# Patient Record
Sex: FEMALE | Race: WHITE | HISPANIC OR LATINO | Employment: PART TIME | URBAN - METROPOLITAN AREA
[De-identification: names, ages, dates, MRNs, and addresses within clinical notes are randomized per-mention and may not be internally consistent; named-entity substitution may affect disease eponyms.]

---

## 2020-04-28 ENCOUNTER — TELEPHONE (OUTPATIENT)
Dept: MEDICAL GROUP | Facility: MEDICAL CENTER | Age: 35
End: 2020-04-28

## 2020-04-28 NOTE — TELEPHONE ENCOUNTER
Left message with patient about no show to appointment today 4/28/20.  Explained this was her first no show and the no show policy.

## 2020-05-06 ENCOUNTER — TELEPHONE (OUTPATIENT)
Dept: HEALTH INFORMATION MANAGEMENT | Facility: OTHER | Age: 35
End: 2020-05-06

## 2020-05-06 NOTE — TELEPHONE ENCOUNTER
1. Caller Name: kaitlin valdez            Call Back Number: [phone]  [facility] PCP or Specialty Provider: No          2.  Does patient have any active symptoms of respiratory illness? Yes, the patient reports the following respiratory symptoms: shortness of breath or difficulty breathing.    3.  Does patient have any comoribidities? COPD    4.  Has the patient traveled in the last 14 days OR had any known contact with someone who is suspected or confirmed to have COVID-19?  No.    5. Disposition: Cleared by RN Triage as potential is low for COVID-19; OK to keep/schedule appointment    Note routed to [facility] Provider: JG only.pt has no symptoms whatsoever except occ watery and itchy eyes relieved by allergy medicine. Pt states shes had allergies for many years. pt has asthma for many years as well and has no resp symptoms for 6 months.  Pt cleared for pcp visit in person by stefania pacheco rn

## 2020-07-15 ENCOUNTER — HOSPITAL ENCOUNTER (EMERGENCY)
Facility: MEDICAL CENTER | Age: 35
End: 2020-07-15
Attending: EMERGENCY MEDICINE
Payer: MEDICAID

## 2020-07-15 VITALS
WEIGHT: 213.41 LBS | OXYGEN SATURATION: 98 % | TEMPERATURE: 97.8 F | RESPIRATION RATE: 18 BRPM | SYSTOLIC BLOOD PRESSURE: 128 MMHG | DIASTOLIC BLOOD PRESSURE: 85 MMHG | HEART RATE: 59 BPM | BODY MASS INDEX: 37.81 KG/M2 | HEIGHT: 63 IN

## 2020-07-15 DIAGNOSIS — R10.84 GENERALIZED ABDOMINAL PAIN: ICD-10-CM

## 2020-07-15 LAB
ALBUMIN SERPL BCP-MCNC: 3.5 G/DL (ref 3.2–4.9)
ALBUMIN/GLOB SERPL: 1.4 G/DL
ALP SERPL-CCNC: 57 U/L (ref 30–99)
ALT SERPL-CCNC: 20 U/L (ref 2–50)
ANION GAP SERPL CALC-SCNC: 10 MMOL/L (ref 7–16)
APPEARANCE UR: ABNORMAL
AST SERPL-CCNC: 17 U/L (ref 12–45)
BACTERIA #/AREA URNS HPF: ABNORMAL /HPF
BASOPHILS # BLD AUTO: 0.4 % (ref 0–1.8)
BASOPHILS # BLD: 0.03 K/UL (ref 0–0.12)
BILIRUB SERPL-MCNC: 0.2 MG/DL (ref 0.1–1.5)
BILIRUB UR QL STRIP.AUTO: NEGATIVE
BUN SERPL-MCNC: 16 MG/DL (ref 8–22)
CALCIUM SERPL-MCNC: 8.4 MG/DL (ref 8.5–10.5)
CHLORIDE SERPL-SCNC: 107 MMOL/L (ref 96–112)
CO2 SERPL-SCNC: 22 MMOL/L (ref 20–33)
COLOR UR: YELLOW
CREAT SERPL-MCNC: 0.52 MG/DL (ref 0.5–1.4)
EKG IMPRESSION: NORMAL
EOSINOPHIL # BLD AUTO: 0.17 K/UL (ref 0–0.51)
EOSINOPHIL NFR BLD: 2.5 % (ref 0–6.9)
EPI CELLS #/AREA URNS HPF: ABNORMAL /HPF
ERYTHROCYTE [DISTWIDTH] IN BLOOD BY AUTOMATED COUNT: 45.7 FL (ref 35.9–50)
GLOBULIN SER CALC-MCNC: 2.5 G/DL (ref 1.9–3.5)
GLUCOSE SERPL-MCNC: 117 MG/DL (ref 65–99)
GLUCOSE UR STRIP.AUTO-MCNC: NEGATIVE MG/DL
HCG SERPL QL: NEGATIVE
HCT VFR BLD AUTO: 42 % (ref 37–47)
HGB BLD-MCNC: 13.7 G/DL (ref 12–16)
HYALINE CASTS #/AREA URNS LPF: ABNORMAL /LPF
IMM GRANULOCYTES # BLD AUTO: 0.03 K/UL (ref 0–0.11)
IMM GRANULOCYTES NFR BLD AUTO: 0.4 % (ref 0–0.9)
KETONES UR STRIP.AUTO-MCNC: NEGATIVE MG/DL
LEUKOCYTE ESTERASE UR QL STRIP.AUTO: NEGATIVE
LIPASE SERPL-CCNC: 17 U/L (ref 11–82)
LYMPHOCYTES # BLD AUTO: 1.65 K/UL (ref 1–4.8)
LYMPHOCYTES NFR BLD: 24.4 % (ref 22–41)
MCH RBC QN AUTO: 31.8 PG (ref 27–33)
MCHC RBC AUTO-ENTMCNC: 32.6 G/DL (ref 33.6–35)
MCV RBC AUTO: 97.4 FL (ref 81.4–97.8)
MICRO URNS: ABNORMAL
MONOCYTES # BLD AUTO: 0.62 K/UL (ref 0–0.85)
MONOCYTES NFR BLD AUTO: 9.2 % (ref 0–13.4)
NEUTROPHILS # BLD AUTO: 4.27 K/UL (ref 2–7.15)
NEUTROPHILS NFR BLD: 63.1 % (ref 44–72)
NITRITE UR QL STRIP.AUTO: NEGATIVE
NRBC # BLD AUTO: 0 K/UL
NRBC BLD-RTO: 0 /100 WBC
PH UR STRIP.AUTO: 7.5 [PH] (ref 5–8)
PLATELET # BLD AUTO: 370 K/UL (ref 164–446)
PMV BLD AUTO: 9.6 FL (ref 9–12.9)
POTASSIUM SERPL-SCNC: 3.8 MMOL/L (ref 3.6–5.5)
PROT SERPL-MCNC: 6 G/DL (ref 6–8.2)
PROT UR QL STRIP: NEGATIVE MG/DL
RBC # BLD AUTO: 4.31 M/UL (ref 4.2–5.4)
RBC # URNS HPF: ABNORMAL /HPF
RBC UR QL AUTO: ABNORMAL
SODIUM SERPL-SCNC: 139 MMOL/L (ref 135–145)
SP GR UR STRIP.AUTO: 1.03
TROPONIN T SERPL-MCNC: <6 NG/L (ref 6–19)
UROBILINOGEN UR STRIP.AUTO-MCNC: 1 MG/DL
WBC # BLD AUTO: 6.8 K/UL (ref 4.8–10.8)
WBC #/AREA URNS HPF: ABNORMAL /HPF

## 2020-07-15 PROCEDURE — 93005 ELECTROCARDIOGRAM TRACING: CPT | Mod: EDC | Performed by: EMERGENCY MEDICINE

## 2020-07-15 PROCEDURE — A9270 NON-COVERED ITEM OR SERVICE: HCPCS | Mod: EDC | Performed by: EMERGENCY MEDICINE

## 2020-07-15 PROCEDURE — 85025 COMPLETE CBC W/AUTO DIFF WBC: CPT | Mod: EDC

## 2020-07-15 PROCEDURE — 700102 HCHG RX REV CODE 250 W/ 637 OVERRIDE(OP): Mod: EDC | Performed by: EMERGENCY MEDICINE

## 2020-07-15 PROCEDURE — 84484 ASSAY OF TROPONIN QUANT: CPT | Mod: EDC

## 2020-07-15 PROCEDURE — 80053 COMPREHEN METABOLIC PANEL: CPT | Mod: EDC

## 2020-07-15 PROCEDURE — 83690 ASSAY OF LIPASE: CPT | Mod: EDC

## 2020-07-15 PROCEDURE — 81001 URINALYSIS AUTO W/SCOPE: CPT | Mod: EDC

## 2020-07-15 PROCEDURE — 99284 EMERGENCY DEPT VISIT MOD MDM: CPT | Mod: EDC

## 2020-07-15 PROCEDURE — 84703 CHORIONIC GONADOTROPIN ASSAY: CPT | Mod: EDC

## 2020-07-15 RX ORDER — OMEPRAZOLE 40 MG/1
40 CAPSULE, DELAYED RELEASE ORAL 2 TIMES DAILY
Qty: 20 CAP | Refills: 0 | Status: SHIPPED | OUTPATIENT
Start: 2020-07-15 | End: 2020-07-25

## 2020-07-15 RX ORDER — OMEPRAZOLE 40 MG/1
40 CAPSULE, DELAYED RELEASE ORAL 2 TIMES DAILY
Qty: 20 CAP | Refills: 0 | Status: SHIPPED | OUTPATIENT
Start: 2020-07-15 | End: 2020-07-15 | Stop reason: SDUPTHER

## 2020-07-15 RX ADMIN — LIDOCAINE HYDROCHLORIDE 30 ML: 20 SOLUTION OROPHARYNGEAL at 15:38

## 2020-07-15 SDOH — HEALTH STABILITY: MENTAL HEALTH: HOW OFTEN DO YOU HAVE A DRINK CONTAINING ALCOHOL?: 2-4 TIMES A MONTH

## 2020-07-15 NOTE — ED PROVIDER NOTES
ED Provider Note    CHIEF COMPLAINT  Chief Complaint   Patient presents with   • Abdominal Pain     pt states lower abdominal pain which radiates to bilateral flanks and into back.        HPI  Jenni Easton is a 34 y.o. female who presents with abdominal pain.  The patient states of last couple weeks she has had periodic abdominal pain.  She states the pain is in the lower quadrants and radiates through to her back.  She is unaware of any exacerbating or relieving factors.  Over the last several days she is also had periodic substernal chest pain.  She describes it as burning.  Again she is unaware of any exacerbating or relieving factors.  She does not have any associated dyspnea, nausea, nor diaphoresis.  She does not have any cardiac risk factors.  She also denies risk factors from a DVT standpoint.  She states she has been under a lot of stress recently.    REVIEW OF SYSTEMS  See HPI for further details. All other systems are negative.     PAST MEDICAL HISTORY  No past medical history on file.    FAMILY HISTORY  [unfilled]    SOCIAL HISTORY  Social History     Socioeconomic History   • Marital status:      Spouse name: Not on file   • Number of children: Not on file   • Years of education: Not on file   • Highest education level: Not on file   Occupational History   • Not on file   Social Needs   • Financial resource strain: Not on file   • Food insecurity     Worry: Not on file     Inability: Not on file   • Transportation needs     Medical: Not on file     Non-medical: Not on file   Tobacco Use   • Smoking status: Current Some Day Smoker     Years: 10.00     Types: Cigarettes   • Smokeless tobacco: Never Used   Substance and Sexual Activity   • Alcohol use: Yes     Alcohol/week: 1.5 oz     Types: 3 Cans of beer per week     Frequency: 2-4 times a month   • Drug use: Yes     Comment: marijuana   • Sexual activity: Not Currently   Lifestyle   • Physical activity     Days per week: Not on file      "Minutes per session: Not on file   • Stress: Not on file   Relationships   • Social connections     Talks on phone: Not on file     Gets together: Not on file     Attends Yazdanism service: Not on file     Active member of club or organization: Not on file     Attends meetings of clubs or organizations: Not on file     Relationship status: Not on file   • Intimate partner violence     Fear of current or ex partner: Not on file     Emotionally abused: Not on file     Physically abused: Not on file     Forced sexual activity: Not on file   Other Topics Concern   • Not on file   Social History Narrative   • Not on file       SURGICAL HISTORY  No past surgical history on file.    CURRENT MEDICATIONS  Home Medications     Reviewed by Dexter Elias R.N. (Registered Nurse) on 07/15/20 at 1448  Med List Status: Not Addressed   Medication Last Dose Status   albuterol (VENTOLIN OR PROVENTIL) 108 (90 BASE) MCG/ACT AERS inhalation aerosol  Active   ALBUTEROL SULFATE INH  Active                ALLERGIES  Allergies   Allergen Reactions   • Pollen Extract      \"Eyes water,can't breathe\"       PHYSICAL EXAM  VITAL SIGNS: /50   Pulse 88   Temp 36.5 °C (97.7 °F) (Temporal)   Resp 18   Ht 1.6 m (5' 3\")   Wt 96.8 kg (213 lb 6.5 oz)   LMP 07/14/2020 (Exact Date)   SpO2 100%   BMI 37.80 kg/m²       Constitutional: Anxious but nontoxic  HENT: Normocephalic, Atraumatic, Bilateral external ears normal, Oropharynx moist, No oral exudates, Nose normal.   Eyes: PERRLA, EOMI, Conjunctiva normal, No discharge.   Neck: Normal range of motion, No tenderness, Supple, No stridor.   Lymphatic: No lymphadenopathy noted.   Cardiovascular: Normal heart rate, Normal rhythm, No murmurs, No rubs, No gallops.   Thorax & Lungs: Normal breath sounds, No respiratory distress, No wheezing, No chest tenderness.   Abdomen: Bowel sounds normal, Soft, mild diffuse tenderness, No masses, No pulsatile masses.   Skin: Warm, Dry, No erythema, No " rash.   Back: No tenderness, No CVA tenderness.   Extremities: Intact distal pulses, No edema, No tenderness, No cyanosis, No clubbing.   Neurologic: Alert & oriented x 3, Normal motor function, Normal sensory function, No focal deficits noted.   Psychiatric: Affect normal, Judgment normal, Mood normal.     Results for orders placed or performed during the hospital encounter of 07/15/20   CBC WITH DIFFERENTIAL   Result Value Ref Range    WBC 6.8 4.8 - 10.8 K/uL    RBC 4.31 4.20 - 5.40 M/uL    Hemoglobin 13.7 12.0 - 16.0 g/dL    Hematocrit 42.0 37.0 - 47.0 %    MCV 97.4 81.4 - 97.8 fL    MCH 31.8 27.0 - 33.0 pg    MCHC 32.6 (L) 33.6 - 35.0 g/dL    RDW 45.7 35.9 - 50.0 fL    Platelet Count 370 164 - 446 K/uL    MPV 9.6 9.0 - 12.9 fL    Neutrophils-Polys 63.10 44.00 - 72.00 %    Lymphocytes 24.40 22.00 - 41.00 %    Monocytes 9.20 0.00 - 13.40 %    Eosinophils 2.50 0.00 - 6.90 %    Basophils 0.40 0.00 - 1.80 %    Immature Granulocytes 0.40 0.00 - 0.90 %    Nucleated RBC 0.00 /100 WBC    Neutrophils (Absolute) 4.27 2.00 - 7.15 K/uL    Lymphs (Absolute) 1.65 1.00 - 4.80 K/uL    Monos (Absolute) 0.62 0.00 - 0.85 K/uL    Eos (Absolute) 0.17 0.00 - 0.51 K/uL    Baso (Absolute) 0.03 0.00 - 0.12 K/uL    Immature Granulocytes (abs) 0.03 0.00 - 0.11 K/uL    NRBC (Absolute) 0.00 K/uL   COMP METABOLIC PANEL   Result Value Ref Range    Sodium 139 135 - 145 mmol/L    Potassium 3.8 3.6 - 5.5 mmol/L    Chloride 107 96 - 112 mmol/L    Co2 22 20 - 33 mmol/L    Anion Gap 10.0 7.0 - 16.0    Glucose 117 (H) 65 - 99 mg/dL    Bun 16 8 - 22 mg/dL    Creatinine 0.52 0.50 - 1.40 mg/dL    Calcium 8.4 (L) 8.5 - 10.5 mg/dL    AST(SGOT) 17 12 - 45 U/L    ALT(SGPT) 20 2 - 50 U/L    Alkaline Phosphatase 57 30 - 99 U/L    Total Bilirubin 0.2 0.1 - 1.5 mg/dL    Albumin 3.5 3.2 - 4.9 g/dL    Total Protein 6.0 6.0 - 8.2 g/dL    Globulin 2.5 1.9 - 3.5 g/dL    A-G Ratio 1.4 g/dL   LIPASE   Result Value Ref Range    Lipase 17 11 - 82 U/L    URINALYSIS,CULTURE IF INDICATED    Specimen: Urine   Result Value Ref Range    Micro Urine Req Microscopic    BETA-HCG QUALITATIVE SERUM   Result Value Ref Range    Beta-Hcg Qualitative Serum Negative Negative   TROPONIN   Result Value Ref Range    Troponin T <6 6 - 19 ng/L   ESTIMATED GFR   Result Value Ref Range    GFR If African American >60 >60 mL/min/1.73 m 2    GFR If Non African American >60 >60 mL/min/1.73 m 2   EKG   Result Value Ref Range    Report       [facility] Emergency Dept.    Test Date:  2020-07-15  Pt Name:    KINJAL COOK       Department: ER  MRN:        [id number]                      Room:       [facility]  Gender:     Female                       Technician: [id number]  :        [id number]                   Requested By:ANISHA MARTINEZ  Order #:    [id number]                    Reading MD: ANISHA MARTINEZ MD    Measurements  Intervals                                Axis  Rate:       57                           P:          31  CA:         144                          QRS:        41  QRSD:       100                          T:          42  QT:         432  QTc:        421    Interpretive Statements  Twelve-lead EKG shows a sinus bradycardia with a ventricular to 57, normal  QRS,  normal intervals, no ST segment elevation or depression, normal T waves.  Electronically Signed On 7- 16:14:12 PDT by ANISHA MARTINEZ MD         COURSE & MEDICAL DECISION MAKING  Pertinent Labs & Imaging studies reviewed. (See chart for details)  This a 34-year-old female who presents to the emergency department with abdominal discomfort.  Clinically the patient's abdomen is nonsurgical.  This is been intermittent over the last several weeks.  Therefore a surgical process would be unlikely.  Laboratory analysis does not show any evidence of hepatitis nor pancreatitis.  Inflammatory versus irritable bowel disease would still be in the differential.  I would expect her white blood  cell count to be elevated this is from inflammatory bowel disease.  She is also some periodic substernal burning chest discomfort and states she is been under a lot of stress.  The patient's heart score is 0 and therefore from a cardiac standpoint she is very low risk.  She had a GI cocktail with some resolution I suspect her chest pain is from esophagitis.  Based on this I suspect she most likely has some gastritis as well that is causing her presenting symptoms.  The patient will be discharged home on Prilosec 40 mg twice a day.  If this is not effective over the next 48 to 72 hours she will return for repeat examination.    Of note the patient is also been ruled out from a pulmonary embolus via the PERC criteria.    FINAL IMPRESSION  1.  Abdominal pain  2.  Chest pain suspect esophagitis    Disposition  The patient will be discharged in stable condition         Electronically signed by: Gorge Best M.D., 7/15/2020 3:10 PM

## 2020-07-15 NOTE — ED TRIAGE NOTES
"Chief Complaint   Patient presents with   • Abdominal Pain     pt states lower abdominal pain which radiates to bilateral flanks and into back.      Pt ambulatory to triage with above complaint.  Pt states 3 weeks of pain which has been getting worse.  Pt states some N/V \"off and on.\" pt denies any blood in emesis.     Pt instructed to triage process and advised to alert staff of any changes.  Pt returned to lobby.  Pt states understanding.     "

## 2020-07-15 NOTE — ED NOTES
Discharge instructions given for abdominal pain with an RX for prilosec. All questions answered. Pt ambulated out of ER without difficulty. Pt aware to follow up with primary md or return here for increased pain.

## 2020-10-20 ENCOUNTER — OFFICE VISIT (OUTPATIENT)
Dept: MEDICAL GROUP | Facility: MEDICAL CENTER | Age: 35
End: 2020-10-20
Attending: PHYSICIAN ASSISTANT
Payer: MEDICAID

## 2020-10-20 VITALS
SYSTOLIC BLOOD PRESSURE: 110 MMHG | HEART RATE: 70 BPM | TEMPERATURE: 97 F | BODY MASS INDEX: 36.68 KG/M2 | WEIGHT: 207 LBS | DIASTOLIC BLOOD PRESSURE: 66 MMHG | RESPIRATION RATE: 16 BRPM | OXYGEN SATURATION: 95 % | HEIGHT: 63 IN

## 2020-10-20 DIAGNOSIS — M79.671 BILATERAL FOOT PAIN: ICD-10-CM

## 2020-10-20 DIAGNOSIS — J45.20 MILD INTERMITTENT ASTHMA WITHOUT COMPLICATION: ICD-10-CM

## 2020-10-20 DIAGNOSIS — Z00.00 HEALTH CARE MAINTENANCE: ICD-10-CM

## 2020-10-20 DIAGNOSIS — L30.9 CHRONIC ECZEMA OF FOOT: ICD-10-CM

## 2020-10-20 DIAGNOSIS — M79.672 BILATERAL FOOT PAIN: ICD-10-CM

## 2020-10-20 PROCEDURE — 99204 OFFICE O/P NEW MOD 45 MIN: CPT | Performed by: PHYSICIAN ASSISTANT

## 2020-10-20 PROCEDURE — 99213 OFFICE O/P EST LOW 20 MIN: CPT | Performed by: PHYSICIAN ASSISTANT

## 2020-10-20 PROCEDURE — 99203 OFFICE O/P NEW LOW 30 MIN: CPT | Performed by: PHYSICIAN ASSISTANT

## 2020-10-20 RX ORDER — MINERAL OIL/HYDROPHIL PETROLAT
OINTMENT (GRAM) TOPICAL
Qty: 50 G | Refills: 3 | Status: SHIPPED | OUTPATIENT
Start: 2020-10-20 | End: 2021-05-26

## 2020-10-20 RX ORDER — BETAMETHASONE DIPROPIONATE 0.05 %
OINTMENT (GRAM) TOPICAL
Qty: 50 G | Refills: 1 | Status: SHIPPED | OUTPATIENT
Start: 2020-10-20 | End: 2020-11-24 | Stop reason: SDUPTHER

## 2020-10-20 RX ORDER — ALBUTEROL SULFATE 90 UG/1
2 AEROSOL, METERED RESPIRATORY (INHALATION) EVERY 6 HOURS PRN
Qty: 8.5 G | Refills: 11 | Status: SHIPPED | OUTPATIENT
Start: 2020-10-20 | End: 2021-12-30 | Stop reason: SDUPTHER

## 2020-10-20 ASSESSMENT — PATIENT HEALTH QUESTIONNAIRE - PHQ9: CLINICAL INTERPRETATION OF PHQ2 SCORE: 0

## 2020-10-20 ASSESSMENT — ENCOUNTER SYMPTOMS
WEIGHT LOSS: 0
DIZZINESS: 0
SHORTNESS OF BREATH: 0
DOUBLE VISION: 0
WHEEZING: 0
NERVOUS/ANXIOUS: 0
VOMITING: 0
CONSTIPATION: 0
PALPITATIONS: 0
SORE THROAT: 0
CLAUDICATION: 0
DEPRESSION: 0
WEAKNESS: 0
FEVER: 0
BLOOD IN STOOL: 0
COUGH: 0
BLURRED VISION: 0
SINUS PAIN: 0
PHOTOPHOBIA: 0
NAUSEA: 0
DIARRHEA: 0
HEADACHES: 0
TINGLING: 0
CHILLS: 0

## 2020-10-20 ASSESSMENT — FIBROSIS 4 INDEX: FIB4 SCORE: 0.37

## 2020-10-20 ASSESSMENT — LIFESTYLE VARIABLES: SUBSTANCE_ABUSE: 0

## 2020-10-20 NOTE — PROGRESS NOTES
"Chief Complaint   Patient presents with   • Asthma   • Medication Refill   • Establish Care       Subjective:     HPI:   Jenin Garcia is a 34 y.o. female here to establish care,  and to discuss the evaluation and management of:    Bilateral foot pain  Onset/SARAI: Began 13 years ago.   Location: Bilateral painful foot rash.   Duration: Constant.   Character: \"Extreme itching and burning sensation\". Reports foot sweating, stinky feet and foot swelling.   Alleviating factors: Wearing shoes, heat.    Aggravating factors: stress.   No radiation.   Treatments tried: \"cream\" monotherapy. She cannot remember the name of it.   No red flags: None.       ROS  Review of Systems   Constitutional: Negative for chills, fever, malaise/fatigue and weight loss.   HENT: Negative for congestion, sinus pain and sore throat.    Eyes: Negative for blurred vision, double vision and photophobia.   Respiratory: Negative for cough, shortness of breath and wheezing.    Cardiovascular: Negative for chest pain, palpitations, claudication and leg swelling.   Gastrointestinal: Negative for blood in stool, constipation, diarrhea, melena, nausea and vomiting.   Genitourinary: Negative for dysuria, frequency, hematuria and urgency.   Musculoskeletal:        + bilateral foot pain   Skin: Positive for itching and rash.   Neurological: Negative for dizziness, tingling, weakness and headaches.   Psychiatric/Behavioral: Negative for depression, substance abuse and suicidal ideas. The patient is not nervous/anxious.        Allergies   Allergen Reactions   • Pollen Extract      \"Eyes water,can't breathe\"       Current medicines (including changes today)  Current Outpatient Medications   Medication Sig Dispense Refill   • betamethasone dipropionate (DIPROLENE) 0.05 % Ointment Apply 1-2 grams twice daily for 4 weeks. 50 g 1   • hydrophilic ointment (AQUAPHOR) Ointment Apply 1-2 grams twice daily on the bilateral feet. 50 g 3   • albuterol 108 (90 " Base) MCG/ACT Aero Soln inhalation aerosol Inhale 2 Puffs by mouth every 6 hours as needed for Shortness of Breath. 8.5 g 11     No current facility-administered medications for this visit.      She  has a past medical history of Asthma.  She  has a past surgical history that includes appendectomy; tubal coagulation laparoscopic bilateral; and ventral hernia repair.  Social History     Tobacco Use   • Smoking status: Current Some Day Smoker     Years: 10.00     Types: Cigarettes   • Smokeless tobacco: Never Used   • Tobacco comment: about 2 cigarettes a week.    Substance Use Topics   • Alcohol use: Yes     Alcohol/week: 1.5 oz     Types: 3 Cans of beer per week     Frequency: 2-4 times a month   • Drug use: Yes     Comment: marijuana       Family History   Problem Relation Age of Onset   • Stroke Mother    • Psychiatric Illness Mother    • Asthma Father    • Hypertension Father    • Hyperlipidemia Father    • Heart Disease Father    • Asthma Sister    • Diabetes Maternal Grandmother    • Diabetes Maternal Grandfather    • Allergies Son    • Asthma Son    • Allergies Son    • Allergies Daughter      Family Status   Relation Name Status   • Mo  Alive   • Fa  Alive   • Sis  Alive   • Bro  Alive   • Sis  Alive   • Bro  Alive   • MGMo  (Not Specified)   • MGFa  (Not Specified)   • Son  (Not Specified)   • Son  (Not Specified)   • Alfredo  (Not Specified)       Patient Active Problem List    Diagnosis Date Noted   • Bilateral foot pain 10/20/2020   • Mild intermittent asthma without complication 07/01/2015   • H/O tubal ligation 07/01/2015   • Obesity (BMI 30-39.9) 07/01/2015   • Migraine with aura and without status migrainosus, not intractable 07/01/2015   • Eczema 07/01/2015   • Problems related to high-risk sexual behavior 07/01/2015   • History of drug abuse (HCC) 07/01/2015        Objective:     /66 (BP Location: Right arm, Patient Position: Sitting, BP Cuff Size: Adult)   Pulse 70   Temp 36.1 °C (97 °F)  "(Temporal)   Resp 16   Ht 1.6 m (5' 3\")   Wt 93.9 kg (207 lb)   SpO2 95%  Body mass index is 36.67 kg/m².    Physical Exam:  Physical Exam   Constitutional: She is oriented to person, place, and time and well-developed, well-nourished, and in no distress.   HENT:   Head: Normocephalic.   Right Ear: External ear normal.   Left Ear: External ear normal.   Eyes: Pupils are equal, round, and reactive to light.   Neck: Normal range of motion. No thyromegaly present.   Cardiovascular: Normal rate, regular rhythm and normal heart sounds.   Pulmonary/Chest: Effort normal and breath sounds normal.   Musculoskeletal: Normal range of motion.   Lymphadenopathy:     She has no cervical adenopathy.        Right: No supraclavicular adenopathy present.        Left: No supraclavicular adenopathy present.   Neurological: She is alert and oriented to person, place, and time. Gait normal.   Skin: Skin is warm and dry. Rash noted.   Eczematous dermatitis of the bilateral feet. Visible excoriations present. Scarring of the bilateral dorsal hands with small excoriations present.    Psychiatric: Affect and judgment normal.        Assessment and Plan:     The following treatment plan was discussed:    1. Bilateral foot pain/Chronic eczema of foot  - This is a chronic persistent condition.   - Patient seems to be starting to develop this over the bilateral hands as well. Etiology unknown, however resembles palmoplantar dyshidrotic eczema.   - Plan: Trial high potency corticosteroid treatment daily for 4 weeks.  - betamethasone dipropionate (DIPROLENE) 0.05 % Ointment; Apply 1-2 grams twice daily for 4 weeks.  Dispense: 50 g; Refill: 1  - hydrophilic ointment (AQUAPHOR) Ointment; Apply 1-2 grams twice daily on the bilateral feet.  Dispense: 50 g; Refill: 3  - Follow up in 4 weeks for re-evaluation.    3. Health care maintenance  - HEMOGLOBIN A1C; Future  - Lipid Profile; Future  - TSH WITH REFLEX TO FT4; Future  - VITAMIN D,25 HYDROXY; " Future  - Complete labs before next follow up visit.     4. Mild intermittent asthma without complication  - Albuterol 108 (90 Base) MCG/ACT Aero Soln inhalation aerosol; Inhale 2 Puffs by mouth every 6 hours as needed for Shortness of Breath.  Dispense: 8.5 g; Refill: 11       Any change or worsening of signs or symptoms, patient encouraged to follow-up or report to emergency room for further evaluation. Patient verbalizes understanding and agrees.    Follow-Up: Return in about 1 month (around 11/20/2020) for Med check, F/u labs.      PLEASE NOTE: This dictation was created using voice recognition software. I have made every reasonable attempt to correct obvious errors, but I expect that there are errors of grammar and possibly content that I did not discover before finalizing the note.

## 2020-10-20 NOTE — ASSESSMENT & PLAN NOTE
"Onset/SARAI: Began 13 years ago.   Location: Bilateral painful foot rash.   Duration: Constant.   Character: \"Extreme itching and burning sensation\". Reports foot sweating, stinky feet and foot swelling.   Alleviating factors: Wearing shoes, heat.    Aggravating factors: stress.   No radiation.   Treatments tried: \"cream\" monotherapy. She cannot remember the name of it.   No red flags: None.         "

## 2020-10-29 NOTE — RESULT ENCOUNTER NOTE
Mixed hyperlipidemia present. 10 year cardiovascular risk score of 3.06%.  There is no need to start the patient on a statin at this time.  We will encourage the patient to focus on lifestyle changes that include a well-balanced diet and regular exercise.  Vitamin D insufficiency present.  Patient will need to get started on a vitamin D supplement.  I will send this over the pharmacy for her.

## 2020-11-24 ENCOUNTER — OFFICE VISIT (OUTPATIENT)
Dept: MEDICAL GROUP | Facility: MEDICAL CENTER | Age: 35
End: 2020-11-24
Attending: PHYSICIAN ASSISTANT
Payer: MEDICAID

## 2020-11-24 VITALS
TEMPERATURE: 98.1 F | HEART RATE: 60 BPM | DIASTOLIC BLOOD PRESSURE: 62 MMHG | BODY MASS INDEX: 36.86 KG/M2 | RESPIRATION RATE: 16 BRPM | HEIGHT: 63 IN | OXYGEN SATURATION: 95 % | SYSTOLIC BLOOD PRESSURE: 100 MMHG | WEIGHT: 208 LBS

## 2020-11-24 DIAGNOSIS — E78.2 MIXED HYPERLIPIDEMIA: ICD-10-CM

## 2020-11-24 DIAGNOSIS — L30.9 CHRONIC ECZEMA OF FOOT: ICD-10-CM

## 2020-11-24 PROCEDURE — 99214 OFFICE O/P EST MOD 30 MIN: CPT | Performed by: PHYSICIAN ASSISTANT

## 2020-11-24 PROCEDURE — 99213 OFFICE O/P EST LOW 20 MIN: CPT | Performed by: PHYSICIAN ASSISTANT

## 2020-11-24 RX ORDER — BETAMETHASONE DIPROPIONATE 0.5 MG/G
OINTMENT, AUGMENTED TOPICAL
COMMUNITY
Start: 2020-10-24 | End: 2021-05-26

## 2020-11-24 RX ORDER — BETAMETHASONE DIPROPIONATE 0.05 %
OINTMENT (GRAM) TOPICAL
Qty: 50 G | Refills: 5 | Status: SHIPPED | OUTPATIENT
Start: 2020-11-24 | End: 2021-05-26

## 2020-11-24 ASSESSMENT — ENCOUNTER SYMPTOMS
SHORTNESS OF BREATH: 0
TINGLING: 0
NAUSEA: 0
WHEEZING: 0
COUGH: 0
FEVER: 0
BLOOD IN STOOL: 0
CONSTIPATION: 0
VOMITING: 0
CLAUDICATION: 0
WEAKNESS: 0
CHILLS: 0
HEADACHES: 0
PALPITATIONS: 0
WEIGHT LOSS: 0
DIARRHEA: 0
DIZZINESS: 0

## 2020-11-24 ASSESSMENT — FIBROSIS 4 INDEX: FIB4 SCORE: 0.38

## 2020-11-24 NOTE — PROGRESS NOTES
"Chief Complaint   Patient presents with   • Follow-Up       Subjective:     HPI:   Jenni Garcia is a 35 y.o. female here to discuss the evaluation and management of:    Eczema  Jenni reports today for follow up regarding bilateral foot eczema. She reports that she has been using the Diprolene ointment in combination with the Aquaphor cream in which she has seen great improvement of her skin.  She reports that she no longer is having swelling or pain of the bilateral feet.  The affected skin is healing well.  She has no concerns regarding this today.    Mixed hyperlipidemia  36 yo female presents today for f/u regarding hyperlipidemia.  Is not on any current treatments.   No clinical ASCVD.  10 yr ASCVD risk score: 2%.  No cardiovascular risk factors.    Last lipid panel:   Lab Results   Component Value Date/Time    CHOLSTRLTOT 157 07/09/2015 10:18 AM    LDL 98 07/09/2015 10:18 AM    HDL 46 07/09/2015 10:18 AM    TRIGLYCERIDE 67 07/09/2015 10:18 AM       ROS  Review of Systems   Constitutional: Negative for chills, fever, malaise/fatigue and weight loss.   Respiratory: Negative for cough, shortness of breath and wheezing.    Cardiovascular: Negative for chest pain, palpitations, claudication and leg swelling.   Gastrointestinal: Negative for blood in stool, constipation, diarrhea, melena, nausea and vomiting.   Skin: Positive for rash. Negative for itching.   Neurological: Negative for dizziness, tingling, weakness and headaches.       Allergies   Allergen Reactions   • Pollen Extract      \"Eyes water,can't breathe\"       Current medicines (including changes today)  Current Outpatient Medications   Medication Sig Dispense Refill   • betamethasone dipropionate (DIPROLENE) 0.05 % Ointment Apply 1-2 grams twice daily for 4 weeks. 50 g 5   • hydrophilic ointment (AQUAPHOR) Ointment Apply 1-2 grams twice daily on the bilateral feet. 50 g 3   • albuterol 108 (90 Base) MCG/ACT Aero Soln inhalation aerosol " "Inhale 2 Puffs by mouth every 6 hours as needed for Shortness of Breath. 8.5 g 11     No current facility-administered medications for this visit.        Social History     Tobacco Use   • Smoking status: Current Some Day Smoker     Years: 10.00     Types: Cigarettes   • Smokeless tobacco: Never Used   • Tobacco comment: about 2 cigarettes a week.    Substance Use Topics   • Alcohol use: Yes     Alcohol/week: 1.5 oz     Types: 3 Cans of beer per week     Frequency: 2-4 times a month   • Drug use: Yes     Comment: marijuana       Patient Active Problem List    Diagnosis Date Noted   • Mixed hyperlipidemia 11/24/2020   • Bilateral foot pain 10/20/2020   • Mild intermittent asthma without complication 07/01/2015   • H/O tubal ligation 07/01/2015   • Obesity (BMI 30-39.9) 07/01/2015   • Migraine with aura and without status migrainosus, not intractable 07/01/2015   • Eczema 07/01/2015   • Problems related to high-risk sexual behavior 07/01/2015   • History of drug abuse (HCC) 07/01/2015       Family History   Problem Relation Age of Onset   • Stroke Mother    • Psychiatric Illness Mother    • Asthma Father    • Hypertension Father    • Hyperlipidemia Father    • Heart Disease Father    • Asthma Sister    • Diabetes Maternal Grandmother    • Diabetes Maternal Grandfather    • Allergies Son    • Asthma Son    • Allergies Son    • Allergies Daughter         Objective:     /62 (BP Location: Right arm, Patient Position: Sitting, BP Cuff Size: Adult)   Pulse 60   Temp 36.7 °C (98.1 °F) (Temporal)   Resp 16   Ht 1.6 m (5' 3\")   Wt 94.3 kg (208 lb)   SpO2 95%  Body mass index is 36.85 kg/m².    Physical Exam:  Physical Exam   Constitutional: She is oriented to person, place, and time and well-developed, well-nourished, and in no distress.   HENT:   Head: Normocephalic.   Right Ear: External ear normal.   Left Ear: External ear normal.   Eyes: Pupils are equal, round, and reactive to light.   Neck: Normal range of " motion.   Cardiovascular: Normal rate, regular rhythm and normal heart sounds.   Pulmonary/Chest: Effort normal and breath sounds normal.   Musculoskeletal: Normal range of motion.   Neurological: She is alert and oriented to person, place, and time. Gait normal.   Skin: Skin is warm and dry.   Healing eczematous rash of bilateral feet.   Psychiatric: Affect and judgment normal.   Vitals reviewed.      Assessment and Plan:     The following treatment plan was discussed:    1. Chronic eczema of foot  - This is a chronic improving condition.  - Plan: Continue with Diprolene ointment in combination with Aquaphor as needed.  - Betamethasone dipropionate (DIPROLENE) 0.05 % Ointment; Apply 1-2 grams twice daily for 4 weeks.  Dispense: 50 g; Refill: 5  -Follow-up as needed.    2. Mixed hyperlipidemia  - This is a new condition.  - Cardiovascular risk score below 7.5%.  No need to start the patient on a statin at this time.  - Discussed the importance of lifestyle modifications that include well-balanced eating and exercise regularly.  - We will plan to recheck this in about 6 months to 1 year.    Any change or worsening of signs or symptoms, patient encouraged to follow-up or report to emergency room for further evaluation. Patient verbalizes understanding and agrees.    Follow-Up: Return in about 4 weeks (around 12/22/2020) for Annual/wellness visit.      PLEASE NOTE: This dictation was created using voice recognition software. I have made every reasonable attempt to correct obvious errors, but I expect that there are errors of grammar and possibly content that I did not discover before finalizing the note.

## 2020-11-24 NOTE — ASSESSMENT & PLAN NOTE
Jenni reports today for follow up regarding bilateral foot eczema. She reports that she has been using the Diprolene ointment in combination with the Aquaphor cream in which she has seen great improvement of her skin.  She reports that she no longer is having swelling or pain of the bilateral feet.  The affected skin is healing well.  She has no concerns regarding this today.

## 2020-11-24 NOTE — ASSESSMENT & PLAN NOTE
34 yo female presents today for f/u regarding hyperlipidemia.  Is not on any current treatments.   No clinical ASCVD.  10 yr ASCVD risk score: 2%.  No cardiovascular risk factors.    Last lipid panel:   Lab Results   Component Value Date/Time    CHOLSTRLTOT 157 07/09/2015 10:18 AM    LDL 98 07/09/2015 10:18 AM    HDL 46 07/09/2015 10:18 AM    TRIGLYCERIDE 67 07/09/2015 10:18 AM

## 2020-12-02 ENCOUNTER — OFFICE VISIT (OUTPATIENT)
Dept: MEDICAL GROUP | Facility: MEDICAL CENTER | Age: 35
End: 2020-12-02
Attending: PHYSICIAN ASSISTANT
Payer: MEDICAID

## 2020-12-02 ENCOUNTER — HOSPITAL ENCOUNTER (OUTPATIENT)
Facility: MEDICAL CENTER | Age: 35
End: 2020-12-02
Attending: PHYSICIAN ASSISTANT
Payer: MEDICAID

## 2020-12-02 VITALS
OXYGEN SATURATION: 96 % | RESPIRATION RATE: 16 BRPM | SYSTOLIC BLOOD PRESSURE: 110 MMHG | BODY MASS INDEX: 38.39 KG/M2 | WEIGHT: 216.7 LBS | TEMPERATURE: 97.7 F | HEART RATE: 85 BPM | HEIGHT: 63 IN | DIASTOLIC BLOOD PRESSURE: 64 MMHG

## 2020-12-02 DIAGNOSIS — Z12.4 ENCOUNTER FOR PAPANICOLAOU SMEAR FOR CERVICAL CANCER SCREENING: ICD-10-CM

## 2020-12-02 DIAGNOSIS — Z11.3 SCREENING EXAMINATION FOR SEXUALLY TRANSMITTED DISEASE: ICD-10-CM

## 2020-12-02 DIAGNOSIS — Z23 NEED FOR VACCINATION: ICD-10-CM

## 2020-12-02 PROCEDURE — 90732 PPSV23 VACC 2 YRS+ SUBQ/IM: CPT

## 2020-12-02 PROCEDURE — 90732 PPSV23 VACC 2 YRS+ SUBQ/IM: CPT | Performed by: PHYSICIAN ASSISTANT

## 2020-12-02 PROCEDURE — G0101 CA SCREEN;PELVIC/BREAST EXAM: HCPCS | Mod: EP | Performed by: PHYSICIAN ASSISTANT

## 2020-12-02 PROCEDURE — 99214 OFFICE O/P EST MOD 30 MIN: CPT | Performed by: PHYSICIAN ASSISTANT

## 2020-12-02 ASSESSMENT — FIBROSIS 4 INDEX: FIB4 SCORE: 0.38

## 2020-12-02 NOTE — PROGRESS NOTES
Subjective:     CC:   Chief Complaint   Patient presents with   • Gynecologic Exam       HPI:   Jenni Garcia is a 35 y.o. female who presents for annual exam. She is feeling well and denies any complaints.    Ob-Gyn/ History:    Patient has GYN provider: No.  /Para: 5/5.  Last Pap Smear: more than 3 years ago. Reports a history of abnormal pap smears.  Gyn Surgery: None.   Current Contraceptive Method: None. She is currently sexually active.  Last menstrual period:  20.  Periods regular. moderate bleeding. Cramping is severe.   She does not take OTC analgesics for cramps.  Experiences bloating/fluid retention. No vaginal discharge.  Post-menopausal bleeding: None.   Urinary incontinence: None.     Health Maintenance  Advanced directive: None.   Osteoporosis Screen/ DEXA: N/A.   PT/vit D for falls prevention: N/A.   Cholesterol Screening: N/A.  Diabetes Screening: Completed.   Aspirin Use: N/A.   Diet: Reports that she has not been eating well. Now she has started work and is planning on eating better.   Exercise: Goes on walks everyday.   Substance Abuse: None.   Safe in relationship.  Seat belts, bike helmet, gun safety discussed.  Sun protection used.    Cancer screening  Colorectal Cancer Screening: N/A.  Lung Cancer Screening: N/A.  Cervical Cancer Screening: Completed today.  Breast Cancer Screening: N/A.    Infectious disease screening/Immunizations  --STI Screening: screening performed today.   --Practices safe sex.  --HIV Screening: UTD.   --Hepatitis C Screening: UTD.  --Immunizations:    Influenza: UTD.   Tetanus: UTD.   Shingles: N/A   Pneumococcal: Will receive today    Other immunizations: UTD.     She  has a past medical history of Asthma.  She  has a past surgical history that includes appendectomy; tubal coagulation laparoscopic bilateral; and ventral hernia repair.    Family History   Problem Relation Age of Onset   • Stroke Mother    • Psychiatric Illness Mother    • Asthma  Father    • Hypertension Father    • Hyperlipidemia Father    • Heart Disease Father    • Asthma Sister    • Diabetes Maternal Grandmother    • Diabetes Maternal Grandfather    • Allergies Son    • Asthma Son    • Allergies Son    • Allergies Daughter        Social History     Socioeconomic History   • Marital status:      Spouse name: Not on file   • Number of children: Not on file   • Years of education: Not on file   • Highest education level: Not on file   Occupational History   • Not on file   Social Needs   • Financial resource strain: Not on file   • Food insecurity     Worry: Not on file     Inability: Not on file   • Transportation needs     Medical: Not on file     Non-medical: Not on file   Tobacco Use   • Smoking status: Current Some Day Smoker     Years: 10.00     Types: Cigarettes   • Smokeless tobacco: Never Used   • Tobacco comment: about 2 cigarettes a week.    Substance and Sexual Activity   • Alcohol use: Yes     Alcohol/week: 1.5 oz     Types: 3 Cans of beer per week     Frequency: 2-4 times a month   • Drug use: Yes     Comment: marijuana   • Sexual activity: Not Currently     Partners: Male   Lifestyle   • Physical activity     Days per week: Not on file     Minutes per session: Not on file   • Stress: Not on file   Relationships   • Social connections     Talks on phone: Not on file     Gets together: Not on file     Attends [employer] service: Not on file     Active member of club or organization: Not on file     Attends meetings of clubs or organizations: Not on file     Relationship status: Not on file   • Intimate partner violence     Fear of current or ex partner: Not on file     Emotionally abused: Not on file     Physically abused: Not on file     Forced sexual activity: Not on file   Other Topics Concern   • Not on file   Social History Narrative   • Not on file       Patient Active Problem List    Diagnosis Date Noted   • Mixed hyperlipidemia 11/24/2020   • Bilateral foot pain  "10/20/2020   • Mild intermittent asthma without complication 07/01/2015   • H/O tubal ligation 07/01/2015   • Obesity (BMI 30-39.9) 07/01/2015   • Migraine with aura and without status migrainosus, not intractable 07/01/2015   • Eczema 07/01/2015   • Problems related to high-risk sexual behavior 07/01/2015   • History of drug abuse (HCC) 07/01/2015         Current Outpatient Medications   Medication Sig Dispense Refill   • betamethasone dipropionate (DIPROLENE) 0.05 % Ointment Apply 1-2 grams twice daily for 4 weeks. 50 g 5   • augmented betamethasone dipropionate (DIPROLENE-AF) 0.05 % ointment APPLY 1 TO 2 GRAMS OF OINTMENT TOPICALLY TWICE DAILY FOR 4 WEEKS     • hydrophilic ointment (AQUAPHOR) Ointment Apply 1-2 grams twice daily on the bilateral feet. 50 g 3   • albuterol 108 (90 Base) MCG/ACT Aero Soln inhalation aerosol Inhale 2 Puffs by mouth every 6 hours as needed for Shortness of Breath. 8.5 g 11     No current facility-administered medications for this visit.      Allergies   Allergen Reactions   • Pollen Extract      \"Eyes water,can't breathe\"       Review of Systems  Constitutional: Negative for fever, chills and malaise/fatigue.   HENT: Negative for congestion.    Eyes: Negative for pain.   Respiratory: Negative for cough and shortness of breath.    Cardiovascular: Negative for leg swelling.   Gastrointestinal: Negative for nausea, vomiting, abdominal pain and diarrhea.   Genitourinary: Negative for dysuria and hematuria.   Skin: Negative for rash.   Neurological: Negative for dizziness, focal weakness and headaches.   Endo/Heme/Allergies: Does not bruise/bleed easily.   Psychiatric/Behavioral: Negative for depression.  The patient is not nervous/anxious.      Objective:     /64 (BP Location: Left arm, Patient Position: Sitting, BP Cuff Size: Adult)   Pulse 85   Temp 36.5 °C (97.7 °F) (Temporal)   Resp 16   Ht 1.6 m (5' 3\")   Wt 98.3 kg (216 lb 11.2 oz)   SpO2 96%   BMI 38.39 kg/m²   Body " mass index is 38.39 kg/m².  Wt Readings from Last 4 Encounters:   12/02/20 98.3 kg (216 lb 11.2 oz)   11/24/20 94.3 kg (208 lb)   10/20/20 93.9 kg (207 lb)   07/15/20 96.8 kg (213 lb 6.5 oz)       Physical Exam:  Constitutional: Well-developed and well-nourished. Not diaphoretic. No distress.   Skin: Skin is warm and dry. No rash noted.  Head: Atraumatic without lesions.  Eyes: Conjunctivae and extraocular motions are normal. Pupils are equal, round, and reactive to light. No scleral icterus.   Ears:  External ears unremarkable. Tympanic membranes clear and intact.  Nose: Nares patent. Septum midline. Turbinates without erythema nor edema. No discharge.   Mouth/Throat: Dentition is normal. Tongue normal. Oropharynx is clear and moist. Posterior pharynx without erythema or exudates.  Neck: Supple, trachea midline. Normal range of motion. No thyromegaly present. No lymphadenopathy--cervical or supraclavicular.  Cardiovascular: Regular rate and rhythm, S1 and S2 without murmur, rubs, or gallops.  Lungs: Normal inspiratory effort, CTA bilaterally, no wheezes/rhonchi/rales  Breast: Denied.   Abdomen: Soft, non tender, and without distention. Active bowel sounds in all four quadrants. No rebound, guarding, masses or HSM.  : Perineum and external genitalia normal without rash. Vagina with normal and physiologic discharge. Cervix without visible lesions or discharge.   Extremities: No cyanosis, clubbing, erythema, nor edema. Distal pulses intact and symmetric.   Musculoskeletal: All major joints AROM full in all directions without pain.  Neurological: Alert and oriented x 3. DTRs 2+/3 and symmetric. No cranial nerve deficit. 5/5 myotomes. Sensation intact. Negative Rhomberg.  Psychiatric:  Behavior, mood, and affect are appropriate.    A chaperone was offered to the patient during today's exam. Chaperone name: Melvin Drummond MA  was present.    Assessment and Plan:     1. Need for vaccination  PneumoVax PPV23 =>1yo   2.  Encounter for Papanicolaou smear for cervical cancer screening  THINPREP PAP, REFLEX HPV ON ASC-US AND ABOVE   3. Screening examination for sexually transmitted disease  CHLAMYDIA/GC PCR URINE OR SWAB    VAGINAL PATHOGENS DNA PANEL       HCM:   No follow up labs needed.   Immunizations per orders.   Patient counseled about skin care, diet, supplements, prenatal vitamins, safe sex and exercise.    Follow-up: Return if symptoms worsen or fail to improve.

## 2020-12-04 DIAGNOSIS — Z11.3 SCREENING EXAMINATION FOR SEXUALLY TRANSMITTED DISEASE: ICD-10-CM

## 2020-12-04 DIAGNOSIS — Z12.4 ENCOUNTER FOR PAPANICOLAOU SMEAR FOR CERVICAL CANCER SCREENING: ICD-10-CM

## 2020-12-04 LAB
FORWARD REASON: SPWHY: NORMAL
FORWARD REASON: SPWHY: NORMAL
FORWARDED TO LAB: SPWHR: NORMAL
FORWARDED TO LAB: SPWHR: NORMAL
SPECIMEN SENT (2ND): SPWT2: NORMAL
SPECIMEN SENT (3RD): SPWT3: NORMAL
SPECIMEN SENT: SPWT1: NORMAL
SPECIMEN SENT: SPWT1: NORMAL

## 2020-12-08 ENCOUNTER — HOSPITAL ENCOUNTER (EMERGENCY)
Facility: MEDICAL CENTER | Age: 35
End: 2020-12-08
Attending: EMERGENCY MEDICINE
Payer: MEDICAID

## 2020-12-08 VITALS
WEIGHT: 211.64 LBS | TEMPERATURE: 97.7 F | BODY MASS INDEX: 37.5 KG/M2 | OXYGEN SATURATION: 97 % | HEIGHT: 63 IN | RESPIRATION RATE: 18 BRPM | DIASTOLIC BLOOD PRESSURE: 69 MMHG | HEART RATE: 64 BPM | SYSTOLIC BLOOD PRESSURE: 120 MMHG

## 2020-12-08 DIAGNOSIS — K08.89 DENTALGIA: ICD-10-CM

## 2020-12-08 PROCEDURE — A9270 NON-COVERED ITEM OR SERVICE: HCPCS | Performed by: EMERGENCY MEDICINE

## 2020-12-08 PROCEDURE — 99283 EMERGENCY DEPT VISIT LOW MDM: CPT

## 2020-12-08 PROCEDURE — 700102 HCHG RX REV CODE 250 W/ 637 OVERRIDE(OP): Performed by: EMERGENCY MEDICINE

## 2020-12-08 RX ORDER — AMOXICILLIN 500 MG/1
1 TABLET, FILM COATED ORAL 3 TIMES DAILY
Qty: 30 TAB | Refills: 0 | Status: SHIPPED | OUTPATIENT
Start: 2020-12-08 | End: 2020-12-18

## 2020-12-08 RX ORDER — OXYCODONE HYDROCHLORIDE AND ACETAMINOPHEN 5; 325 MG/1; MG/1
1 TABLET ORAL EVERY 4 HOURS PRN
Qty: 10 TAB | Refills: 0 | Status: SHIPPED | OUTPATIENT
Start: 2020-12-08 | End: 2020-12-11

## 2020-12-08 RX ORDER — OXYCODONE HYDROCHLORIDE AND ACETAMINOPHEN 5; 325 MG/1; MG/1
1 TABLET ORAL ONCE
Status: COMPLETED | OUTPATIENT
Start: 2020-12-08 | End: 2020-12-08

## 2020-12-08 RX ADMIN — OXYCODONE HYDROCHLORIDE AND ACETAMINOPHEN 1 TABLET: 5; 325 TABLET ORAL at 20:03

## 2020-12-08 ASSESSMENT — LIFESTYLE VARIABLES
EVER HAD A DRINK FIRST THING IN THE MORNING TO STEADY YOUR NERVES TO GET RID OF A HANGOVER: NO
DO YOU DRINK ALCOHOL: YES
TOTAL SCORE: 0
TOTAL SCORE: 0
HAVE YOU EVER FELT YOU SHOULD CUT DOWN ON YOUR DRINKING: NO
HAVE PEOPLE ANNOYED YOU BY CRITICIZING YOUR DRINKING: NO
EVER FELT BAD OR GUILTY ABOUT YOUR DRINKING: NO
TOTAL SCORE: 0
CONSUMPTION TOTAL: INCOMPLETE

## 2020-12-08 ASSESSMENT — PAIN DESCRIPTION - PAIN TYPE: TYPE: ACUTE PAIN

## 2020-12-08 ASSESSMENT — FIBROSIS 4 INDEX: FIB4 SCORE: 0.38

## 2020-12-09 NOTE — ED PROVIDER NOTES
"ED Provider Note    Scribed for Hira Jeffery M.D. by Mary Murray. 12/8/2020, 7:52 PM.    Primary care provider: Stephy Mart P.A.-C.  Means of arrival: Walk in   History obtained from: Patient  History limited by: None    CHIEF COMPLAINT  Chief Complaint   Patient presents with   • Dental Pain     Pt reports upper left dental pain. Pt reports she broke a tooth awhile back, but recently the pain has gotten very severe. Pt reports she is new to the area and hasn't been able to establish care with a dentist yet. Pt tearful due to pain.       HPI  Jenni Garcia is a 35 y.o. female who presents to the Emergency Department for upper right dental pain. The patient states that she broke her tooth a while ago, however the pain has become \"unbearable\". She says that she recently moved to the area and hasn't been able to establish care with a dentist yet.    REVIEW OF SYSTEMS  See HPI above     PAST MEDICAL HISTORY   has a past medical history of Asthma.    SURGICAL HISTORY   has a past surgical history that includes appendectomy; tubal coagulation laparoscopic bilateral; and ventral hernia repair.    SOCIAL HISTORY  Social History     Tobacco Use   • Smoking status: Light Tobacco Smoker     Years: 10.00     Types: Cigarettes   • Smokeless tobacco: Never Used   • Tobacco comment: about 2 cigarettes a week.    Substance Use Topics   • Alcohol use: Yes     Alcohol/week: 1.5 oz     Types: 3 Cans of beer per week     Frequency: 2-4 times a month   • Drug use: Yes     Comment: marijuana      Social History     Substance and Sexual Activity   Drug Use Yes    Comment: marijuana       FAMILY HISTORY  Family History   Problem Relation Age of Onset   • Stroke Mother    • Psychiatric Illness Mother    • Asthma Father    • Hypertension Father    • Hyperlipidemia Father    • Heart Disease Father    • Asthma Sister    • Diabetes Maternal Grandmother    • Diabetes Maternal Grandfather    • Allergies Son    • Asthma Son " "   • Allergies Son    • Allergies Daughter        CURRENT MEDICATIONS  Home Medications    **Home medications have not yet been reviewed for this encounter**         ALLERGIES  Allergies   Allergen Reactions   • Pollen Extract      \"Eyes water,can't breathe\"       PHYSICAL EXAM  VITAL SIGNS: /76   Pulse 77   Temp 36.3 °C (97.4 °F) (Temporal)   Resp 14   Ht 1.6 m (5' 3\")   Wt 96 kg (211 lb 10.3 oz)   SpO2 99%   BMI 37.49 kg/m²     Constitutional: Well developed, Well nourished, No acute distress, Non-toxic appearance.   HENT: Normocephalic, Atraumatic, Bilateral external ears normal. Left upper top 3 molars are diseased and carried, 2nd molar completely diseased and fractured down to the base. Gingival.   Eyes: conjunctiva is normal. There are no signs of exudate.  Neck: Supple.     COURSE & MEDICAL DECISION MAKING  Nursing notes, VS, PMSFHx reviewed in chart.    7:52 PM - Patient seen and examined at bedside. Patient will be treated with Percocet 5-325 mg for her symptoms. Updated her on plan for discharge and she verbalizes understanding and agreement to this plan of care.     PPE Note: I personally donned full PPE for all patient encounters during this visit, including being clean-shaven with an N95 respirator mask, gloves, and goggles.     Scribe remained outside the patient's room and did not have any contact with the patient for the duration of patient encounter.      Decision Making:    Patient will be started on antibiotics and pain medication for her symptoms. Informed her that her teeth need to be removed and that I will give her a dental referral.  She has no signs of abscess in the area.  At this point I will start with the antibiotics pain medication the patient is to follow-up with a dentist for further outpatient treatment and care.    The patient will return for new or worsening symptoms and is stable at the time of discharge.    The patient is referred to a primary physician for blood " pressure management, diabetic screening, and for all other preventative health concerns.    I reviewed prescription monitoring program for patient's narcotic use before prescribing a scheduled drug.The patient will not drink alcohol nor drive with prescribed medications      In prescribing controlled substances to this patient, I certify that I have obtained and reviewed the medical history this patient I have also made a good radha effort to obtain applicable records from other providers who have treated the patient and records did not demonstrate any increased risk of substance abuse that would prevent me from prescribing controlled substances.     I have conducted a physical exam and documented it. I have reviewed Ms. Mikey Garcia’s prescription history as maintained by the [place] Prescription Monitoring Program.     I have assessed the patient’s risk for abuse, dependency, and addiction using the validated Opioid Risk Tool available at https://www.mdcalc.com/uzljwi-qbaf-fasb-ort-narcotic-abuse.     Given the above, I believe the benefits of controlled substance therapy outweigh the risks. The reasons for prescribing controlled substances include in my professional opinion, controlled substances are a reasonable choice for this patient. Accordingly, I have discussed the risk and benefits, treatment plan, and alternative therapies with the patient. The patient has been consented for the medication and understands the risks.       DISPOSITION:  Patient will be discharged home in stable condition.    FOLLOW UP:  A Dentist  Dental Referral sheet          OUTPATIENT MEDICATIONS:  New Prescriptions    AMOXICILLIN 500 MG TAB    Take 1 Tab by mouth 3 times a day for 10 days.    OXYCODONE-ACETAMINOPHEN (PERCOCET) 5-325 MG TAB    Take 1 Tab by mouth every four hours as needed for up to 3 days.         FINAL IMPRESSION  1. Dentalgia          IMary (Bruce), am scribing for, and in the presence of, Hira CASTLE  KASSY Jeffery.    Electronically signed by: Mary Murray (Scribe), 12/8/2020    IHira M.D. personally performed the services described in this documentation, as scribed by Mary Murray in my presence, and it is both accurate and complete. E    The note accurately reflects work and decisions made by me.  Hira Jeffery M.D.  12/8/2020  10:13 PM

## 2020-12-09 NOTE — ED NOTES
Pt roomed. Pt crying, reports intolerable pain to left upper jaw that radiates to the left temporal region.

## 2020-12-09 NOTE — ED TRIAGE NOTES
Jenni Garcia  35 y.o. female  Chief Complaint   Patient presents with   • Dental Pain     Pt reports upper left dental pain. Pt reports she broke a tooth awhile back, but recently the pain has gotten very severe. Pt reports she is new to the area and hasn't been able to establish care with a dentist yet. Pt tearful due to pain.       Pt ambulatory to triage with steady gait for above complaint.   Pt is alert and oriented, speaking in full sentences, follows commands and responds appropriately to questions. Resp are even and unlabored. No behavioral indicators of pain.   Pt placed in lobby. Pt educated on triage process. Pt encouraged to alert staff for any changes. This RN masked and in appropriate PPE during encounter.

## 2020-12-17 DIAGNOSIS — Z11.3 SCREENING EXAMINATION FOR SEXUALLY TRANSMITTED DISEASE: ICD-10-CM

## 2021-05-05 ENCOUNTER — OCCUPATIONAL MEDICINE (OUTPATIENT)
Dept: URGENT CARE | Facility: PHYSICIAN GROUP | Age: 36
End: 2021-05-05
Payer: COMMERCIAL

## 2021-05-05 VITALS
BODY MASS INDEX: 34.38 KG/M2 | RESPIRATION RATE: 14 BRPM | DIASTOLIC BLOOD PRESSURE: 60 MMHG | WEIGHT: 194 LBS | OXYGEN SATURATION: 97 % | TEMPERATURE: 97.8 F | HEIGHT: 63 IN | SYSTOLIC BLOOD PRESSURE: 114 MMHG | HEART RATE: 58 BPM

## 2021-05-05 DIAGNOSIS — S83.92XA SPRAIN OF LEFT KNEE, UNSPECIFIED LIGAMENT, INITIAL ENCOUNTER: ICD-10-CM

## 2021-05-05 PROCEDURE — 99202 OFFICE O/P NEW SF 15 MIN: CPT | Performed by: PHYSICIAN ASSISTANT

## 2021-05-05 ASSESSMENT — ENCOUNTER SYMPTOMS
NAUSEA: 0
CHILLS: 0
SHORTNESS OF BREATH: 0
ABDOMINAL PAIN: 0
DIARRHEA: 0
VOMITING: 0
DIZZINESS: 0
FEVER: 0

## 2021-05-05 ASSESSMENT — FIBROSIS 4 INDEX: FIB4 SCORE: 0.38

## 2021-05-05 NOTE — LETTER
[facility] the [facility] [address] [place] [address]  Phone:  [phone] - Fax:  [phone]   [facility] Progress Report and Disability Certification  Date of Service: 2021   No Show:  No  Date / Time of Next Visit: 2021   Claim Information   Patient Name: Jenni Garcia  Claim Number:     Employer: [employer] Date of Injury: 5/3/2021     Insurer / TPA: [id number]  ID / SSN:     Occupation:   Diagnosis: The encounter diagnosis was Sprain of left knee, unspecified ligament, initial encounter.    Medical Information   Related to Industrial Injury? Yes    Subjective Complaints:  DOI: 5/3/21. Patient was stepping down from a ladder when she felt a sudden sharp pain and pressure in the left knee. The knee has been getting swollen intermittently over the past few days after walking or standing for prolonged periods. No falls or trauma. No prior knee injuries. She has been icing the knee and taking OTC aspirin as needed with mild relief of pain.    Objective Findings:    Left knee: No bony tenderness. Decreased range of motion. Tenderness present over the patellar tendon. No medial joint line or lateral joint line tenderness. No LCL laxity, MCL laxity, ACL laxity or PCL laxity.     Comments: + TTP over anterior aspect of left knee     Pre-Existing Condition(s): None    Assessment:   Initial Visit    Status:    Permanent Disability:No    Plan:      Diagnostics:      Comments:       Disability Information   Status: Released to Restricted Duty    From:  2021  Through: 2021 Restrictions are:     Physical Restrictions   Sitting:    Standing:  < or = to 1 hr/day Stooping:    Bending:      Squattin hrs/day Walking:  < or = to 1 hr/day Climbing:    Pushing:      Pulling:    Other:    Reaching Above Shoulder (L):   Reaching Above Shoulder (R):       Reaching Below Shoulder (L):    Reaching Below Shoulder (R):      Not to  exceed Weight Limits   Carrying(hrs):   Weight Limit(lb): < or = to 10 pounds Lifting(hrs):   Weight  Limit(lb): < or = to 10 pounds   Comments: No imaging indicated at today's visit  Patient provided knee brace to use full time while at work  Encouraged icing 2-3 times daily  OTC nsaids as needed for symptomatic relief   RTC in 4 days for follow up    Repetitive Actions   Hands: i.e. Fine Manipulations from Grasping:     Feet: i.e. Operating Foot Controls:     Driving / Operate Machinery:     Provider Name:   Linette Ashby P.A.-C. Physician Signature:  Physician Name:     Clinic Name / Location: [address] [address] Clinic Phone Number: Dept: [phone]   Appointment Time: 3:35 Pm Visit Start Time: 4:00 PM   Check-In Time:  3:48 Pm Visit Discharge Time:  4:48PM   Original-Treating Physician or Chiropractor    Page 2-Insurer/TPA    Page 3-Employer    Page 4-Employee

## 2021-05-05 NOTE — LETTER
"EMPLOYEE’S CLAIM FOR COMPENSATION/ REPORT OF INITIAL TREATMENT  FORM C-4    EMPLOYEE’S CLAIM - PROVIDE ALL INFORMATION REQUESTED   First Name  Jenni Last Name  Mikey Garcia Birthdate                    1985                Sex  female Claim Number   Home Address  [address] Age  35 y.o. Height  1.6 m (5' 3\") Weight  88 kg (194 lb) [facility]     [facility] [place] Zip  [address] Telephone  [phone] (home)    Mailing Address  [address] [place]  [address] Primary Language Spoken  English    Insurer  [employer]   [employer]   Employee's Occupation (Job Title) When Injury or Occupational Disease Occurred      Employer's Name  [employer]  Telephone   [phone]   Employer Address  [facility] [address]  [place]  Zip  [address]    Date of Injury  5/3/2021               Hour of Injury  6:30 AM Date Employer Notified  5/5/2021 Last Day of Work after Injury     or Occupational Disease  5/5/2021 Supervisor to Whom Injury     Reported  Cristian   Address or Location of Accident (if applicable)  [[facility] [address] ]   What were you doing at the time of accident? (if applicable)  Working     How did this injury or occupational disease occur? (Be specific an answer in detail. Use additional sheet if necessary)  was stepping off work ladder as I stepped on my left leg felt like I jammed my knee. felt it began to swell worked thru my shift then went home and Iced my leg.   If you believe that you have an occupational disease, when did you first have knowledge of the disability and it relationship to your employment?   Witnesses to the Accident        Nature of Injury or Occupational Disease  Workers' Compensation  Part(s) of Body Injured or Affected  Knee (L), ,     I certify that the above is true and correct to the best of my knowledge and that I " have provided this information in order to obtain the benefits of [place]  I hereby authorize any physician, chiropractor, surgeon, practitioner, or other person, any hospital, including [facility] hospital, any medical service organization, any insurance company, or other institution or organization to release to each other, any medical or other information, including benefits paid or payable, pertinent to this injury or disease, except information relative to diagnosis, treatment and/or counseling for AIDS, psychological conditions, alcohol or controlled substances, for which I must give specific authorization.  A Photostat of this authorization shall be as valid as the original.     Date   Place   Employee’s Signature   THIS REPORT MUST BE COMPLETED AND MAILED WITHIN 3 WORKING DAYS OF TREATMENT   Place  [facility]  Name of Facility  [place]   Date  5/5/2021 Diagnosis  (S83.92XA) Sprain of left knee, unspecified ligament, initial encounter Is there evidence the injured employee was under the              influence of alcohol and/or another controlled substance at the time of accident?   Hour  4:00 PM Description of Injury or Disease  The encounter diagnosis was Sprain of left knee, unspecified ligament, initial encounter. No   Treatment     Left knee: No bony tenderness. Decreased range of motion. Tenderness present over the patellar tendon. No medial joint line or lateral joint line tenderness. No LCL laxity, MCL laxity, ACL laxity or PCL laxity.     Comments: + TTP over anterior aspect of left knee    Have you advised the patient to remain off work five days or     more? No   X-Ray Findings      If Yes   From Date  To Date      From information given by the employee, together with medical evidence, can you directly connect this injury or occupational disease as  "job incurred?  Yes If No Full Duty    No Modified Duty  Yes   Is additional medical care by a physician indicated?  Yes If Modified Duty, Specify any Limitations / Restrictions   Standing: < or = to 1 hr/day    Squattin hrs/day Walking: < or = to 1 hr/day    Repetitive Actions  Not To Exceed Weight Limits   Weight Limit (LB): < or = to 10 pounds  Weight Limit (LB): < or = to 10 pounds       Do you know of any previous injury or disease contributing to this condition or occupational disease?                            No   Date  2021 Print Doctor’s Name   Linette Ashby P.A.-C. I certify the employer’s copy of  this form was mailed on:   Address  [address]     [place]  [address]    Provider’s Tax ID Number  [id number] Telephone  Dept: [phone]      e-LINETTE Monroe P.A.-C.  Signature:     Degree          ORIGINAL-TREATING PHYSICIAN OR CHIROPRACTOR    PAGE 2-INSURER/TPA    PAGE 3-EMPLOYER    PAGE 4-EMPLOYEE        Form C-4 (rev.10/07)           BRIEF DESCRIPTION OF RIGHTS AND BENEFITS  (Pursuant to [place] 616[place])    Notice of Injury or Occupational Disease (Incident Report Form C-1): If an injury or occupational disease (OD) arises out of and in the course of employment, you must provide written notice to your employer as soon as practicable, but no later than 7 days after the accident or OD. Your employer shall maintain a sufficient supply of the required forms.    Claim for Compensation (Form C-4): If medical treatment is sought, the form C-4 is available at the place of initial treatment. A completed \"Claim for Compensation\" (Form C-4) must be filed within 90 days after an accident or OD. The treating physician or chiropractor must, within 3 working days after treatment, complete and mail to the employer, the employer's insurer and third-party , the Claim for Compensation.    Medical Treatment: If you require medical treatment for " your on-the-job injury or OD, you may be required to select a physician or chiropractor from a list provided by your workers’ compensation insurer, if it has contracted with an Organization for Managed Care (MCO) or Preferred Provider Organization (PPO) or providers of health care. If your employer has not entered into a contract with an MCO or PPO, you may select a physician or chiropractor from the Panel of Physicians and Chiropractors. Any medical costs related to your industrial injury or OD will be paid by your insurer.    Temporary Total Disability (TTD): If your doctor has certified that you are unable to work for a period of at least 5 consecutive days, or 5 cumulative days in a 20-day period, or places restrictions on you that your employer does not accommodate, you may be entitled to TTD compensation.    Temporary Partial Disability (TPD): If the wage you receive upon reemployment is less than the compensation for TTD to which you are entitled, the insurer may be required to pay you TPD compensation to make up the difference. TPD can only be paid for a maximum of 24 months.    Permanent Partial Disability (PPD): When your medical condition is stable and there is an indication of a PPD as a result of your injury or OD, within 30 days, your insurer must arrange for an evaluation by a rating physician or chiropractor to determine the degree of your PPD. The amount of your PPD award depends on the date of injury, the results of the PPD evaluation, your age and wage.    Permanent Total Disability (PTD): If you are medically certified by a treating physician or chiropractor as permanently and totally disabled and have been granted a PTD status by your insurer, you are entitled to receive monthly benefits not to exceed 66 2/3% of your average monthly wage. The amount of your PTD payments is subject to reduction if you previously received a lump-sum PPD award.    Vocational Rehabilitation Services: You may be  eligible for vocational rehabilitation services if you are unable to return to the job due to a permanent physical impairment or permanent restrictions as a result of your injury or occupational disease.    Transportation and Per Barrera Reimbursement: You may be eligible for travel expenses and per barrera associated with medical treatment.    Reopening: You may be able to reopen your claim if your condition worsens after claim closure.     Appeal Process: If you disagree with a written determination issued by the insurer or the insurer does not respond to your request, you may appeal to the Department of Administration, , by following the instructions contained in your determination letter. You must appeal the determination within 70 days from the date of the determination letter at [address], [address], [place], [place] [address], or [address], [place], [place] [address]. If you disagree with the  decision, you may appeal to the Department of Administration, . You must file your appeal within 30 days from the date of the  decision letter at [address], [address], [place], [place] [address], or [address], [address], [place], [place] [address]. If you disagree with a decision of an , you may file a petition for judicial review with the District Court. You must do so within 30 days of the Appeal Officer’s decision. You may be represented by an  at your own expense or you may contact the [facility] for possible representation.    [place]  for Injured Workers (NAIW): If you disagree with a  decision, you may request that NAIW represent you without charge at an  Hearing. For information regarding denial of benefits, you may contact the [facility] at: [address], [address], [place] [address], [phone], or [address], [facility]  [place] [address], [phone]    To File a Complaint with the Division: If you wish to file a complaint with the  of the Division of Industrial Relations (DIR),  please contact the Workers’ Compensation Section, [address], [address], [place], [place] [address], telephone [phone], or [address], [address], [place], [place] [address], telephone [phone].    For assistance with Workers’ Compensation Issues: You may contact the [facility] Office for Consumer Health Assistance, [address], [address], [place], [place] [address], Toll Free 1-600.611.9401, Web site: http://[facility].[place].gov/Programs/ELENA E-mail: [email or web address]              __________________________________________________________________                                    _________________            Employee Name / Signature                                                                                                                            Date                                                                                                                                                                                                                              D-2 (rev. 10/20)

## 2021-05-05 NOTE — PROGRESS NOTES
"Subjective:      Jenni Garcia is a 35 y.o. female who presents with Knee Injury (L knee, W/C )      DOI: 5/3/21. Patient was stepping down from a ladder when she felt a sudden sharp pain and pressure in the left knee. The knee has been getting swollen intermittently over the past few days after walking or standing for prolonged periods. No falls or trauma. No prior knee injuries. She has been icing the knee and taking OTC aspirin as needed with mild relief of pain.      Knee Injury  Pertinent negatives include no abdominal pain, chest pain, chills, congestion, fever, nausea, rash or vomiting.         Review of Systems   Constitutional: Negative for chills and fever.   HENT: Negative for congestion.    Respiratory: Negative for shortness of breath.    Cardiovascular: Negative for chest pain.   Gastrointestinal: Negative for abdominal pain, diarrhea, nausea and vomiting.   Genitourinary: Negative.    Musculoskeletal:        + knee pain   Skin: Negative for rash.   Neurological: Negative for dizziness.        Objective:     /60 (BP Location: Left arm, Patient Position: Sitting, BP Cuff Size: Adult)   Pulse (!) 58   Temp 36.6 °C (97.8 °F) (Temporal)   Resp 14   Ht 1.6 m (5' 3\")   Wt 88 kg (194 lb)   SpO2 97%   BMI 34.37 kg/m²      Physical Exam  Vitals and nursing note reviewed.   Constitutional:       General: She is not in acute distress.     Appearance: Normal appearance. She is well-developed. She is not diaphoretic.   HENT:      Head: Normocephalic and atraumatic.      Right Ear: External ear normal.      Left Ear: External ear normal.      Nose: Nose normal.   Eyes:      Conjunctiva/sclera: Conjunctivae normal.   Cardiovascular:      Rate and Rhythm: Normal rate.   Pulmonary:      Effort: Pulmonary effort is normal.   Musculoskeletal:      Cervical back: Normal range of motion.      Left knee: No bony tenderness. Decreased range of motion. Tenderness present over the patellar tendon. No " "medial joint line or lateral joint line tenderness. No LCL laxity, MCL laxity, ACL laxity or PCL laxity.     Comments: + TTP over anterior aspect of left knee    Skin:     General: Skin is warm and dry.   Neurological:      Mental Status: She is alert and oriented to person, place, and time.   Psychiatric:         Behavior: Behavior normal.            PMH:  has a past medical history of Asthma.  MEDS:   Current Outpatient Medications:   •  betamethasone dipropionate (DIPROLENE) 0.05 % Ointment, Apply 1-2 grams twice daily for 4 weeks., Disp: 50 g, Rfl: 5  •  augmented betamethasone dipropionate (DIPROLENE-AF) 0.05 % ointment, APPLY 1 TO 2 GRAMS OF OINTMENT TOPICALLY TWICE DAILY FOR 4 WEEKS, Disp: , Rfl:   •  hydrophilic ointment (AQUAPHOR) Ointment, Apply 1-2 grams twice daily on the bilateral feet., Disp: 50 g, Rfl: 3  •  albuterol 108 (90 Base) MCG/ACT Aero Soln inhalation aerosol, Inhale 2 Puffs by mouth every 6 hours as needed for Shortness of Breath., Disp: 8.5 g, Rfl: 11  ALLERGIES:   Allergies   Allergen Reactions   • Pollen Extract      \"Eyes water,can't breathe\"     SURGHX:   Past Surgical History:   Procedure Laterality Date   • APPENDECTOMY     • TUBAL COAGULATION LAPAROSCOPIC BILATERAL     • VENTRAL HERNIA REPAIR       SOCHX:  reports that she has been smoking cigarettes. She has smoked for the past 10.00 years. She has never used smokeless tobacco. She reports current alcohol use of about 1.5 oz of alcohol per week. She reports current drug use.  FH: family history includes Allergies in her daughter, son, and son; Asthma in her father, sister, and son; Diabetes in her maternal grandfather and maternal grandmother; Heart Disease in her father; Hyperlipidemia in her father; Hypertension in her father; Psychiatric Illness in her mother; Stroke in her mother.       Assessment/Plan:        1. Sprain of left knee, unspecified ligament, initial encounter    No imaging indicated at today's visit  Patient " provided knee brace to use full time while at work  Encouraged icing 2-3 times daily  OTC nsaids as needed for symptomatic relief   RTC in 4 days for follow up

## 2021-05-09 ENCOUNTER — OCCUPATIONAL MEDICINE (OUTPATIENT)
Dept: URGENT CARE | Facility: PHYSICIAN GROUP | Age: 36
End: 2021-05-09
Payer: COMMERCIAL

## 2021-05-09 VITALS
WEIGHT: 194 LBS | HEIGHT: 63 IN | OXYGEN SATURATION: 98 % | SYSTOLIC BLOOD PRESSURE: 104 MMHG | TEMPERATURE: 97.3 F | RESPIRATION RATE: 16 BRPM | BODY MASS INDEX: 34.38 KG/M2 | DIASTOLIC BLOOD PRESSURE: 60 MMHG | HEART RATE: 59 BPM

## 2021-05-09 DIAGNOSIS — S83.92XD SPRAIN OF LEFT KNEE, UNSPECIFIED LIGAMENT, SUBSEQUENT ENCOUNTER: ICD-10-CM

## 2021-05-09 PROCEDURE — 99213 OFFICE O/P EST LOW 20 MIN: CPT | Performed by: PHYSICIAN ASSISTANT

## 2021-05-09 ASSESSMENT — FIBROSIS 4 INDEX: FIB4 SCORE: 0.38

## 2021-05-09 NOTE — PROGRESS NOTES
"Subjective:     Jenni Garcia is a 35 y.o. female who presents for Knee Injury (W/C FV left knee)      Date of injury 5/3/2029: Patient presents to urgent care for her second visit after an anterior knee strain.  Her work restrictions have not been able to be accommodated so she has been resting at home.  She notes her pain has significantly improved.  She has not noticed any knee instability.  She has been wearing the brace well ambulatory without any difficulty.  She feels ready to go back to part-time, she is slightly concerned about working a full 40+ hour work week.  She has not noticed any new weakness or numbness.  She has no second job and no pre-existing contributory conditions.    PMH:   No pertinent past medical history to this problem  MEDS:  Medications were reviewed in EMR  ALLERGIES:  Allergies were reviewed in EMR  SOCHX:  Works as a   FH:   No pertinent family history to this problem       Objective:     /60 (BP Location: Left arm, Patient Position: Sitting, BP Cuff Size: Adult)   Pulse (!) 59   Temp 36.3 °C (97.3 °F) (Temporal)   Resp 16   Ht 1.6 m (5' 3\")   Wt 88 kg (194 lb)   SpO2 98%   BMI 34.37 kg/m²     Alert nontoxic female no acute distress.  Knee brace in place on the left knee, when it is removed there is nontenderness, no edema, no deformity.  Ambulatory with a steady station and gait, no antalgia.  Neurovascularly intact with full strength of the extremity.  No ligamentous laxity    Assessment/Plan:       There are no diagnoses linked to this encounter.  • Released to Restricted Duty FROM 5/9/2021 TO 5/19/2021  • Patient must wear knee brace at work.  I recommended patient have half of full day of work, or be reintroduced to work hours gradually.  She should continue her current home regimen with icing and elevating as much as possible.  She can continue over-the-counter anti-inflammatories.  Due to a vacation we will have her follow-up on the 19th, " she is to return to clinic sooner if she has any issues including being able to tolerate her limited work hours.  No squatting as above or activities where the patient's knee is  flexed and weighted.  •      Differential diagnosis, natural history, supportive care, and indications for immediate follow-up discussed.

## 2021-05-19 ENCOUNTER — OCCUPATIONAL MEDICINE (OUTPATIENT)
Dept: URGENT CARE | Facility: PHYSICIAN GROUP | Age: 36
End: 2021-05-19
Payer: COMMERCIAL

## 2021-05-19 VITALS
DIASTOLIC BLOOD PRESSURE: 80 MMHG | OXYGEN SATURATION: 96 % | RESPIRATION RATE: 18 BRPM | HEART RATE: 82 BPM | SYSTOLIC BLOOD PRESSURE: 124 MMHG | TEMPERATURE: 98.4 F

## 2021-05-19 DIAGNOSIS — S83.92XD SPRAIN OF LEFT KNEE, UNSPECIFIED LIGAMENT, SUBSEQUENT ENCOUNTER: ICD-10-CM

## 2021-05-19 PROCEDURE — 99213 OFFICE O/P EST LOW 20 MIN: CPT | Performed by: NURSE PRACTITIONER

## 2021-05-19 ASSESSMENT — ENCOUNTER SYMPTOMS
FALLS: 0
FEVER: 0
BRUISES/BLEEDS EASILY: 0
TINGLING: 0
SENSORY CHANGE: 0
CHILLS: 0
WEAKNESS: 0
MYALGIAS: 1

## 2021-05-19 NOTE — PROGRESS NOTES
Subjective:      Jenni Garcia is a 35 y.o. female who presents with Work-Related Injury (W/C follow up. L knee injury)      DOI: 5/3/21. Initial visit: Patient was stepping down from a ladder when she felt a sudden sharp pain and pressure in the left knee. The knee has been getting swollen intermittently over the past few days after walking or standing for prolonged periods. No falls or trauma. No prior knee injuries. She has been icing the knee and taking OTC aspirin as needed with mild relief of pain.       Today: Just returned from vacation this morning. States was able to stand for 3 hr at a time, able to relax, ice application and leg elevation. Denies numbness/tingling in toes. States will get swelling in left knee on and off. Performs daily stretches. States work shift 4 hr/day. Last day of work 6 days ago. Uses Ibuprofen as needed, not needed recently.     HPI  PMH: No pertinent past medical history to this problem  MEDS: Medications were reviewed in Epic  ALLERGIES: Allergies were reviewed in Epic  FH: No pertinent family history to this problem         Review of Systems   Constitutional: Negative for chills, fever and malaise/fatigue.   Musculoskeletal: Positive for joint pain and myalgias. Negative for falls.   Skin: Negative for itching and rash.   Neurological: Negative for tingling, sensory change and weakness.   Endo/Heme/Allergies: Does not bruise/bleed easily.   All other systems reviewed and are negative.         Objective:     /80 (BP Location: Left arm, Patient Position: Sitting, BP Cuff Size: Small adult)   Pulse 82   Temp 36.9 °C (98.4 °F) (Temporal)   Resp 18   SpO2 96%      Physical Exam  Vitals reviewed.   Constitutional:       General: She is awake. She is not in acute distress.     Appearance: Normal appearance. She is well-developed. She is not ill-appearing, toxic-appearing or diaphoretic.   HENT:      Head: Normocephalic.   Eyes:      Pupils: Pupils are equal, round,  and reactive to light.   Cardiovascular:      Rate and Rhythm: Normal rate.   Pulmonary:      Effort: Pulmonary effort is normal.   Musculoskeletal:      Left knee: No swelling, deformity, effusion, erythema, ecchymosis, bony tenderness or crepitus. Normal range of motion. Tenderness present over the medial joint line and lateral joint line. No patellar tendon tenderness. No LCL laxity, MCL laxity, ACL laxity or PCL laxity.Normal alignment, normal meniscus and normal patellar mobility. Normal pulse.   Skin:     General: Skin is warm and dry.      Findings: No erythema or rash.   Neurological:      Mental Status: She is alert and oriented to person, place, and time.   Psychiatric:         Behavior: Behavior is cooperative.         A/O x 3. Skin p/w/d, skin sensation intact. Full range of motion with flexion/extension without difficulty or pain. No antalgic gait. Tenderness to touch at superior, medial apsects of left knee joint. No swelling noted. Knee brace in place on left knee. Equal leg strength.               Assessment/Plan:        1. Sprain of left knee, unspecified ligament, subsequent encounter    May resume work shift of 4 hr/day at this time as she has been doing (1030a-230p)  Continue to use knee brace with ambulation  May continue to use over the counter Ibuprofen as needed for pain/swelling  May continue to use cool compress and leg elevation as needed for pain/swelling  Discussed probable return to work full duty at next visit, will not change work restrictions at this time as she has been on vacation  Recheck on 5/26/21

## 2021-05-19 NOTE — LETTER
[facility] [address] [address]  Phone:  [phone] - Fax:  [phone]   [facility] Progress Report and Disability Certification  Date of Service: 5/19/2021   No Show:  No  Date / Time of Next Visit: 5/26/2021   Claim Information   Patient Name: Jenni Garcia  Claim Number:     Employer: [employer]  Date of Injury: 5/3/2021     Insurer / TPA: Zev  ID / SSN:     Occupation:   Diagnosis: The encounter diagnosis was Sprain of left knee, unspecified ligament, subsequent encounter.    Medical Information   Related to Industrial Injury? Yes    Subjective Complaints:  DOI: 5/3/21. Initial visit: Patient was stepping down from a ladder when she felt a sudden sharp pain and pressure in the left knee. The knee has been getting swollen intermittently over the past few days after walking or standing for prolonged periods. No falls or trauma. No prior knee injuries. She has been icing the knee and taking OTC aspirin as needed with mild relief of pain.       Today: Just returned from vacation this morning. States was able to stand for 3 hr at a time, able to relax, ice application and leg elevation. Denies numbness/tingling in toes. States will get swelling in left knee on and off. Performs daily stretches. States work shift 4 hr/day. Last day of work 6 days ago. Uses Ibuprofen as needed, not needed recently.   Objective Findings: A/O x 3. Skin p/w/d, skin sensation intact. Full range of motion with flexion/extension without difficulty or pain. No antalgic gait. Tenderness to touch at superior, medial apsects of left knee joint. No swelling noted. Knee brace in place on left knee. Equal leg strength.    Pre-Existing Condition(s):     Assessment:   Condition Improved    Status: Additional Care Required  Permanent Disability:No    Plan:      Diagnostics:      Comments:       Disability Information   Status:  Released to Restricted Duty    From:  2021  Through: 2021 Restrictions are: Temporary   Physical Restrictions   Sitting:  < or = to 6 hrs/day Standing:  < or = to 6 hrs/day Stooping:    Bending:      Squattin hrs/day Walking:  < or = to 6 hrs/day Climbin hrs/day Pushing:      Pulling:    Other:    Reaching Above Shoulder (L):   Reaching Above Shoulder (R):       Reaching Below Shoulder (L):    Reaching Below Shoulder (R):      Not to exceed Weight Limits   Carrying(hrs):   Weight Limit(lb):   Lifting(hrs):   Weight  Limit(lb):     Comments: May resume work shift of 4 hr/day at this time as she has been doing ([id number])  Continue to use knee brace with ambulation  May continue to use over the counter Ibuprofen as needed for pain/swelling  May continue to use cool compress and leg elevation as needed for pain/swelling  Discussed probable return to work full duty at next visit, will not change work restrictions at this time as she has been on vacation  Recheck on 21      Repetitive Actions   Hands: i.e. Fine Manipulations from Grasping:     Feet: i.e. Operating Foot Controls:     Driving / Operate Machinery:     Provider Name:   CHASITY Merino Physician Signature:  Physician Name:     Clinic Name / Location: [address] [address] Clinic Phone Number: Dept: [phone]   Appointment Time: 10:00 Am Visit Start Time: 10:11 AM   Check-In Time:  10:06 Am Visit Discharge Time:  11:00 AM   Original-Treating Physician or Chiropractor    Page 2-Insurer/TPA    Page 3-Employer    Page 4-Employee

## 2021-05-19 NOTE — LETTER
[facility] [address] [place] [address]  Phone:  [phone] - Fax:  [phone]   [facility] Progress Report and Disability Certification  Date of Service: 5/19/2021   No Show:  No  Date / Time of Next Visit: 5/26/2021   Claim Information   Patient Name: Jenni Garcia  Claim Number:     Employer: [employer] *** Date of Injury: 5/3/2021     Insurer / TPA: Zev *** ID / SSN:     Occupation:  *** Diagnosis: The encounter diagnosis was Sprain of left knee, unspecified ligament, subsequent encounter.    Medical Information   Related to Industrial Injury? Yes ***   Subjective Complaints:  DOI: 5/3/21. Initial visit: Patient was stepping down from a ladder when she felt a sudden sharp pain and pressure in the left knee. The knee has been getting swollen intermittently over the past few days after walking or standing for prolonged periods. No falls or trauma. No prior knee injuries. She has been icing the knee and taking OTC aspirin as needed with mild relief of pain.       Today: Just returned from vacation this morning. States was able to stand for 3 hr at a time, able to relax, ice application and leg elevation. Denies numbness/tingling in toes. States will get swelling in left knee on and off. Performs daily stretches. States work shift 4 hr/day. Last day of work 6 days ago. Uses Ibuprofen as needed, not needed recently.   Objective Findings: A/O x 3. Skin p/w/d, skin sensation intact. Full range of motion with flexion/extension without difficulty or pain. No antalgic gait. Tenderness to touch at superior, medial apsects of left knee joint. No swelling noted. Knee brace in place on left knee. Equal leg strength.    Pre-Existing Condition(s):     Assessment:   Condition Improved    Status: Additional Care Required  Permanent Disability:No    Plan:      Diagnostics:      Comments:       Disability Information      Status: Released to Restricted Duty    From:  2021  Through: 2021 Restrictions are: Temporary   Physical Restrictions   Sitting:  < or = to 6 hrs/day Standing:  < or = to 6 hrs/day Stooping:    Bending:      Squattin hrs/day Walking:  < or = to 6 hrs/day Climbin hrs/day Pushing:      Pulling:    Other:    Reaching Above Shoulder (L):   Reaching Above Shoulder (R):       Reaching Below Shoulder (L):    Reaching Below Shoulder (R):      Not to exceed Weight Limits   Carrying(hrs):   Weight Limit(lb):   Lifting(hrs):   Weight  Limit(lb):     Comments: May resume work shift of hr/day at this time  Continue to use knee brace with ambulation  May continue to use over the counter Ibuprofen as needed for pain/swelling  May continue to use cool compress and leg elevation as needed for pain/swelling  Discussed probable return to work full duty at next visit, will not change work restrictions at this time as she has been on vacation  Recheck on 21      Repetitive Actions   Hands: i.e. Fine Manipulations from Grasping:     Feet: i.e. Operating Foot Controls:     Driving / Operate Machinery:     Provider Name:   CHASITY Merino Physician Signature:  Physician Name:     Clinic Name / Location: [address] [address] Clinic Phone Number: Dept: [phone]   Appointment Time: 10:00 Am Visit Start Time: 10:11 AM   Check-In Time:  10:06 Am Visit Discharge Time:  ***   Original-Treating Physician or Chiropractor    Page 2-Insurer/TPA    Page 3-Employer    Page 4-Employee

## 2021-05-26 ENCOUNTER — OCCUPATIONAL MEDICINE (OUTPATIENT)
Dept: URGENT CARE | Facility: PHYSICIAN GROUP | Age: 36
End: 2021-05-26
Payer: COMMERCIAL

## 2021-05-26 VITALS
HEIGHT: 63 IN | HEART RATE: 61 BPM | DIASTOLIC BLOOD PRESSURE: 64 MMHG | SYSTOLIC BLOOD PRESSURE: 102 MMHG | TEMPERATURE: 98.1 F | WEIGHT: 196 LBS | RESPIRATION RATE: 12 BRPM | OXYGEN SATURATION: 99 % | BODY MASS INDEX: 34.73 KG/M2

## 2021-05-26 DIAGNOSIS — S86.912D KNEE STRAIN, LEFT, SUBSEQUENT ENCOUNTER: ICD-10-CM
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
## 2021-05-26 PROCEDURE — 99213 OFFICE O/P EST LOW 20 MIN: CPT | Performed by: NURSE PRACTITIONER

## 2021-05-26 ASSESSMENT — PAIN SCALES - GENERAL: PAINLEVEL: 4=SLIGHT-MODERATE PAIN

## 2021-05-26 ASSESSMENT — FIBROSIS 4 INDEX: FIB4 SCORE: 0.38

## 2021-05-26 NOTE — LETTER
[facility] [address] [place] [address]  Phone:  [phone] - Fax:  [phone]   [facility] Progress Report and Disability Certification  Date of Service: 5/26/2021   No Show:  No  Date / Time of Next Visit: 6/5/2021   Claim Information   Patient Name: Jenni Garcia  Claim Number:     Employer: [employer]  Date of Injury: 5/3/2021     Insurer / TPA: Zev  ID / SSN:     Occupation:   Diagnosis: Diagnoses of Knee strain, left, subsequent encounter and Work related injury were pertinent to this visit.    Medical Information   Related to Industrial Injury? Yes    Subjective Complaints:  DOI 05/03/2021  SARAI: stepped off ladder at her work jammed her  Left knee.   Moving around a little more. Intermittent throbbing discomfort. Working at restricted duty. Not taking any medication for pain. Occ takes ASA or ibuprofen. Last dose was yesterday morning. Only taking something every few days.  She is trying elevation and heat packs prn when at home.   Wearing knee brace when she is 'up and out' , outside of her home. She does not wear it at her home.    Objective Findings:     Pre-Existing Condition(s):     Assessment:   Condition Improved    Status: Additional Care Required  Permanent Disability:No    Plan:      Diagnostics:      Comments:       Disability Information   Status: Released to Full Duty    From:  5/26/2021  Through: 6/5/2021 Restrictions are:     Physical Restrictions   Sitting:    Standing:    Stooping:    Bending:      Squatting:    Walking:    Climbing:    Pushing:      Pulling:    Other:    Reaching Above Shoulder (L):   Reaching Above Shoulder (R):       Reaching Below Shoulder (L):    Reaching Below Shoulder (R):      Not to exceed Weight Limits   Carrying(hrs):   Weight Limit(lb):   Lifting(hrs):   Weight  Limit(lb):     Comments: Knee brace only as needed for discomfort.     Repetitive Actions      Hands: i.e. Fine Manipulations from Grasping:     Feet: i.e. Operating Foot Controls:     Driving / Operate Machinery:     Provider Name:   FRANCISCO WilsonPALLY Physician Signature:  Physician Name:     Clinic Name / Location: [address] [address] Clinic Phone Number: Dept: [phone]   Appointment Time: 9:30 Am Visit Start Time: 10:01 AM   Check-In Time:  9:48 Am Visit Discharge Time:  11:20 AM   Original-Treating Physician or Chiropractor    Page 2-Insurer/TPA    Page 3-Employer    Page 4-Employee

## 2021-05-26 NOTE — PROGRESS NOTES
"Subjective:      Jenni Garcia is a 35 y.o. female who presents with Work-Related Injury (DOI 5-3-21, stepped off ladder wrong at work, pain at night, still lite duty,)      DOI 05/03/2021  SARAI: stepped off ladder at her work jammed her  Left knee.   Moving around a little more. Intermittent throbbing discomfort. Working at restricted duty. Not taking any medication for pain. Occ takes ASA or ibuprofen. Last dose was yesterday morning. Only taking something every few days.  She is trying elevation and heat packs prn when at home.   Wearing knee brace when she is 'up and out' , outside of her home. She does not wear it at her home.      HPI    ROS       Objective:     /64 (BP Location: Right arm, Patient Position: Sitting, BP Cuff Size: Adult)   Pulse 61   Temp 36.7 °C (98.1 °F) (Temporal)   Resp 12   Ht 1.6 m (5' 3\")   Wt 88.9 kg (196 lb)   SpO2 99%   BMI 34.72 kg/m²      Physical Exam  Vitals reviewed.   Constitutional:       Appearance: Normal appearance. She is normal weight.   Cardiovascular:      Rate and Rhythm: Normal rate.      Pulses: Normal pulses.   Pulmonary:      Effort: Pulmonary effort is normal.   Musculoskeletal:         General: Normal range of motion.      Left knee: No swelling, deformity, effusion, erythema, ecchymosis, lacerations, bony tenderness or crepitus. Normal range of motion. Tenderness (lateral knee - generalized ) present. No LCL laxity, MCL laxity, ACL laxity or PCL laxity.Normal alignment, normal meniscus and normal patellar mobility. Normal pulse.      Instability Tests: Anterior drawer test negative. Posterior drawer test negative. Anterior Lachman test negative. Medial Kaye test negative and lateral Kaye test negative.   Skin:     General: Skin is warm.      Capillary Refill: Capillary refill takes less than 2 seconds.   Neurological:      Mental Status: She is alert and oriented to person, place, and time.   Psychiatric:         Mood and Affect: " Mood normal.         Behavior: Behavior normal.         Thought Content: Thought content normal.                        Assessment/Plan:         1. Knee strain, left, subsequent encounter     2. Work related injury       See [place] D39   Reviewed previous D39's and C4 with all restrictions and previous treatments.   Release to full duty work.   Encouraged ROM exercises  Decrease use of knee brace.   OTC analgesic prn  As needed for discomfort    Gentle ROM and strengthening exercises for knee

## 2021-05-26 NOTE — PATIENT INSTRUCTIONS
"Journal for Nurse Practitioners, 15(4), 263-267. Retrieved October 7, 2019 from http://clinicalkey.com/nursing\">       Knee Exercises  Ask your health care provider which exercises are safe for you. Do exercises exactly as told by your health care provider and adjust them as directed. It is normal to feel mild stretching, pulling, tightness, or discomfort as you do these exercises. Stop right away if you feel sudden pain or your pain gets worse. Do not begin these exercises until told by your health care provider.  Stretching and range-of-motion exercises  These exercises warm up your muscles and joints and improve the movement and flexibility of your knee. These exercises also help to relieve pain and swelling.  Knee extension, prone  1. Lie on your abdomen (prone position) on a bed.  2. Place your left / right knee just beyond the edge of the surface so your knee is not on the bed. You can put a towel under your left / right thigh just above your kneecap for comfort.  3. Relax your leg muscles and allow gravity to straighten your knee (extension). You should feel a stretch behind your left / right knee.  4. Hold this position for __________ seconds.  5. Scoot up so your knee is supported between repetitions.  Repeat __________ times. Complete this exercise __________ times a day.  Knee flexion, active    1. Lie on your back with both legs straight. If this causes back discomfort, bend your left / right knee so your foot is flat on the floor.  2. Slowly slide your left / right heel back toward your buttocks. Stop when you feel a gentle stretch in the front of your knee or thigh (flexion).  3. Hold this position for __________ seconds.  4. Slowly slide your left / right heel back to the starting position.  Repeat __________ times. Complete this exercise __________ times a day.  Quadriceps stretch, prone    1. Lie on your abdomen on a firm surface, such as a bed or padded floor.  2. Bend your left / right knee and " hold your ankle. If you cannot reach your ankle or pant leg, loop a belt around your foot and grab the belt instead.  3. Gently pull your heel toward your buttocks. Your knee should not slide out to the side. You should feel a stretch in the front of your thigh and knee (quadriceps).  4. Hold this position for __________ seconds.  Repeat __________ times. Complete this exercise __________ times a day.  Hamstring, supine  1. Lie on your back (supine position).  2. Loop a belt or towel over the ball of your left / right foot. The ball of your foot is on the walking surface, right under your toes.  3. Straighten your left / right knee and slowly pull on the belt to raise your leg until you feel a gentle stretch behind your knee (hamstring).  ? Do not let your knee bend while you do this.  ? Keep your other leg flat on the floor.  4. Hold this position for __________ seconds.  Repeat __________ times. Complete this exercise __________ times a day.  Strengthening exercises  These exercises build strength and endurance in your knee. Endurance is the ability to use your muscles for a long time, even after they get tired.  Quadriceps, isometric  This exercise stretches the muscles in front of your thigh (quadriceps) without moving your knee joint (isometric).  1. Lie on your back with your left / right leg extended and your other knee bent. Put a rolled towel or small pillow under your knee if told by your health care provider.  2. Slowly tense the muscles in the front of your left / right thigh. You should see your kneecap slide up toward your hip or see increased dimpling just above the knee. This motion will push the back of the knee toward the floor.  3. For __________ seconds, hold the muscle as tight as you can without increasing your pain.  4. Relax the muscles slowly and completely.  Repeat __________ times. Complete this exercise __________ times a day.  Straight leg raises  This exercise stretches the muscles in  "front of your thigh (quadriceps) and the muscles that move your hips (hip flexors).  1. Lie on your back with your left / right leg extended and your other knee bent.  2. Tense the muscles in the front of your left / right thigh. You should see your kneecap slide up or see increased dimpling just above the knee. Your thigh may even shake a bit.  3. Keep these muscles tight as you raise your leg 4-6 inches (10-15 cm) off the floor. Do not let your knee bend.  4. Hold this position for __________ seconds.  5. Keep these muscles tense as you lower your leg.  6. Relax your muscles slowly and completely after each repetition.  Repeat __________ times. Complete this exercise __________ times a day.  Hamstring, isometric  1. Lie on your back on a firm surface.  2. Bend your left / right knee about __________ degrees.  3. Dig your left / right heel into the surface as if you are trying to pull it toward your buttocks. Tighten the muscles in the back of your thighs (hamstring) to \"dig\" as hard as you can without increasing any pain.  4. Hold this position for __________ seconds.  5. Release the tension gradually and allow your muscles to relax completely for __________ seconds after each repetition.  Repeat __________ times. Complete this exercise __________ times a day.  Hamstring curls  If told by your health care provider, do this exercise while wearing ankle weights. Begin with __________ lb weights. Then increase the weight by 1 lb (0.5 kg) increments. Do not wear ankle weights that are more than __________ lb.  1. Lie on your abdomen with your legs straight.  2. Bend your left / right knee as far as you can without feeling pain. Keep your hips flat against the floor.  3. Hold this position for __________ seconds.  4. Slowly lower your leg to the starting position.  Repeat __________ times. Complete this exercise __________ times a day.  Squats  This exercise strengthens the muscles in front of your thigh and knee " (quadriceps).  1.  front of a table, with your feet and knees pointing straight ahead. You may rest your hands on the table for balance but not for support.  2. Slowly bend your knees and lower your hips like you are going to sit in a chair.  ? Keep your weight over your heels, not over your toes.  ? Keep your lower legs upright so they are parallel with the table legs.  ? Do not let your hips go lower than your knees.  ? Do not bend lower than told by your health care provider.  ? If your knee pain increases, do not bend as low.  3. Hold the squat position for __________ seconds.  4. Slowly push with your legs to return to standing. Do not use your hands to pull yourself to standing.  Repeat __________ times. Complete this exercise __________ times a day.  Wall slides  This exercise strengthens the muscles in front of your thigh and knee (quadriceps).  1. Lean your back against a smooth wall or door, and walk your feet out 18-24 inches (46-61 cm) from it.  2. Place your feet hip-width apart.  3. Slowly slide down the wall or door until your knees bend __________ degrees. Keep your knees over your heels, not over your toes. Keep your knees in line with your hips.  4. Hold this position for __________ seconds.  Repeat __________ times. Complete this exercise __________ times a day.  Straight leg raises  This exercise strengthens the muscles that rotate the leg at the hip and move it away from your body (hip abductors).  1. Lie on your side with your left / right leg in the top position. Lie so your head, shoulder, knee, and hip line up. You may bend your bottom knee to help you keep your balance.  2. Roll your hips slightly forward so your hips are stacked directly over each other and your left / right knee is facing forward.  3. Leading with your heel, lift your top leg 4-6 inches (10-15 cm). You should feel the muscles in your outer hip lifting.  ? Do not let your foot drift forward.  ? Do not let your knee  roll toward the ceiling.  4. Hold this position for __________ seconds.  5. Slowly return your leg to the starting position.  6. Let your muscles relax completely after each repetition.  Repeat __________ times. Complete this exercise __________ times a day.  Straight leg raises  This exercise stretches the muscles that move your hips away from the front of the pelvis (hip extensors).  1. Lie on your abdomen on a firm surface. You can put a pillow under your hips if that is more comfortable.  2. Tense the muscles in your buttocks and lift your left / right leg about 4-6 inches (10-15 cm). Keep your knee straight as you lift your leg.  3. Hold this position for __________ seconds.  4. Slowly lower your leg to the starting position.  5. Let your leg relax completely after each repetition.  Repeat ____5-10______ times. Complete this exercise ____2______ times a day.  This information is not intended to replace advice given to you by your health care provider. Make sure you discuss any questions you have with your health care provider.  Document Released: 11/01/2006 Document Revised: 10/08/2019 Document Reviewed: 10/08/2019  Elsevier Patient Education © 2020 Elsevier Inc.  Semimembranosus Tendinitis    Tendinitis is a condition in which tendons become irritated or swollen. Tendons are bands of tissue that connect muscles to bones and help with joint movements.  The semimembranosus tendon attaches one of the muscles on the back of the thigh (hamstring muscles) to the hip bone and the shin bone (tibia). This tendon helps in straightening the hip and bending the knee. Semimembranosus tendinitis often causes pain on the back inner side of the knee.  What are the causes?  This condition is caused by stress on the tendon, which may result from:  · A sudden increase in your training or in the intensity of your training.  · Overuse of the knee joint.  · Other leg injuries, which force your hamstring muscles to work harder to  support your body (compensation).  What increases the risk?  The following factors may make you more likely to develop this condition:  · Doing activities that involve:  ? Repeated or heavy use of your knee and hip. This can happen during distance running, triathlons, climbing, cycling, or weight lifting.  ? Running down hills.  · Not warming up properly before activity.  · Having the following:  ? Flat feet.  ? Conditions that cause the knee to go out of alignment. These include bowed legs.  ? Poor strength and flexibility.  ? A condition in which the tissue that protects the joints of the knee wears off (osteoarthritis).  What are the signs or symptoms?  Symptoms of this condition include:  · Pain or tenderness in the back inner part of the knee. The pain may:  ? Spread (radiate) up the back of the thigh or down the back of the calf.  ? Get worse during and after exercises that involve use of the knee or hip joints.  · Swelling in the affected area.  · A crackling sound when the tendon is moved or touched.  How is this diagnosed?  This condition may be diagnosed based on:  · Your symptoms.  · Your medical history.  · A physical exam.  Your health care provider may also order tests to confirm the diagnosis or to rule out other conditions. These may include:  · X-rays.  · MRI.  · Ultrasound.  How is this treated?  Treatment for this condition may include:  · Resting your legs and limiting activities that cause pain.  · Using medicines taken by mouth or placed on the skin to help reduce pain and inflammation.  · Applying ice to the area during the first 1-2 days to reduce swelling.  · Having an injection of a medicine into the affected area (cortisone shot) to reduce pain.  · Wearing a knee brace or sleeve on your knee to keep it from bending.  · Working with a physical therapist on exercises to improve strength and flexibility in your leg.  · Ensuring that you wear shoes that fit properly.  In some cases, surgery  may be needed if other treatments do not help.  Follow these instructions at home:  If you have a brace or sleeve:  · Wear the brace or sleeve as told by your health care provider. Remove it only as told by your health care provider.  · Loosen the brace if your toes tingle, become numb, or turn cold and blue.  · Keep the brace or sleeve clean.  · If the brace or sleeve is not waterproof:  ? Do not let it get wet.  ? Cover it with a watertight covering when you take a bath or a shower.  Managing pain and swelling    · If directed, put ice on the injured area.  ? If you have a removable brace or sleeve, remove it as told by your health care provider.  ? Put ice in a plastic bag.  ? Place a towel between your skin and the bag.  ? Leave the ice on for 20 minutes, 2-3 times a day.  · Wear a knee sleeve or compression bandage as told by your health care provider.  · Move your toes often to reduce stiffness and swelling.  · Raise (elevate) the injured area above the level of your heart while you are sitting or lying down.  Activity    · Rest your leg. Avoid activities that cause pain.  · Return to your normal activities as told by your health care provider. Ask your health care provider what activities are safe for you.  · Do exercises as told by your health care provider.  · If directed, apply heat to the affected area before you exercise. Use the heat source that your health care provider recommends, such as a moist heat pack or a heating pad.  ? Place a towel between your skin and the heat source.  ? Leave the heat on for 20-30 minutes.  ? Remove the heat if your skin turns bright red. This is especially important if you are unable to feel pain, heat, or cold. You have a greater risk of getting burned.  General instructions  · Take or apply over-the-counter and prescription medicines only as told by your health care provider.  · Keep all follow-up visits as told by your health care provider. This is important.  How is  this prevented?  · Do not ignore pain in or behind your knee. If you have pain, you should stop your activity, rest, and apply ice.  · Warm up and stretch before being active.  · Cool down and stretch after being active.  · Give your body time to rest between periods of activity.  · Maintain physical fitness, including:  ? Strength.  ? Flexibility.  · If you have flat feet, wear arch supports (orthotics).  Contact a health care provider if:  · Your pain gets worse or does not get better with treatment.  · You hear a popping sound or feel a new popping sensation in your knee.  · You have increased swelling around your knee.  Get help right away if:  · You cannot move your knee.  Summary  · Tendinitis is a condition in which tendons become irritated or swollen. Semimembranosus tendinitis often causes pain on the back inner side of the knee.  · This condition is caused by stress on the tendon.  · It is treated with rest, ice, medicines, and physical therapy. Surgery is sometimes needed.  · Do not ignore pain in or behind your knee. If you have pain, you should stop your activity, rest, and apply ice.  · Get help right away if you cannot move your knee.  This information is not intended to replace advice given to you by your health care provider. Make sure you discuss any questions you have with your health care provider.  Document Released: 12/18/2006 Document Revised: 04/10/2020 Document Reviewed: 11/14/2019  Elsevier Patient Education © 2020 Elsevier Inc.

## 2021-06-03 ENCOUNTER — TELEPHONE (OUTPATIENT)
Dept: OCCUPATIONAL MEDICINE | Facility: CLINIC | Age: 36
End: 2021-06-03

## 2021-07-16 ENCOUNTER — OCCUPATIONAL MEDICINE (OUTPATIENT)
Dept: OCCUPATIONAL MEDICINE | Facility: CLINIC | Age: 36
End: 2021-07-16
Payer: COMMERCIAL

## 2021-07-16 VITALS
RESPIRATION RATE: 16 BRPM | WEIGHT: 196 LBS | BODY MASS INDEX: 34.73 KG/M2 | DIASTOLIC BLOOD PRESSURE: 68 MMHG | OXYGEN SATURATION: 97 % | HEIGHT: 63 IN | HEART RATE: 86 BPM | SYSTOLIC BLOOD PRESSURE: 114 MMHG

## 2021-07-16 DIAGNOSIS — S86.912D KNEE STRAIN, LEFT, SUBSEQUENT ENCOUNTER: ICD-10-CM

## 2021-07-16 PROCEDURE — 99213 OFFICE O/P EST LOW 20 MIN: CPT | Performed by: FAMILY MEDICINE

## 2021-07-16 ASSESSMENT — ENCOUNTER SYMPTOMS
FEVER: 0
FOCAL WEAKNESS: 0
SENSORY CHANGE: 0

## 2021-07-16 ASSESSMENT — FIBROSIS 4 INDEX: FIB4 SCORE: 0.38

## 2021-07-16 NOTE — LETTER
[address],   [address] [place] [address]  Phone:  [phone] - Fax:  [phone]   Occupational Health [facility] Progress Report and Disability Certification  Date of Service: 2021   No Show:  No  Date / Time of Next Visit: 2021 @ 3:30 PM   Claim Information   Patient Name: Jenni Garcia  Claim Number:     Employer: [employer]  Date of Injury: 5/3/2021     Insurer / TPA: Zev  ID / SSN:     Occupation:   Diagnosis: The encounter diagnosis was Knee strain, left, subsequent encounter.    Medical Information   Related to Industrial Injury? Yes    Subjective Complaints:  DOI: 5/3/21  F/u visit left knee strain. SARAI: twisting mechanism when stepping down from ladder. Overall she feels 60% improved. Pain waxes and wanes. Worse with walking for hours. Intermittent locking and swelling. No prior injury or surgery. OTC advil is helpful. She has used a hinged brace but feels a sleeve will be more helpful.    Objective Findings: Left knee: area of perceived pain anterior aspect, tender superior patella, no joint line tenderness, full range of motion, no effusion, no instability. Distal neuro/vascular intact.      Pre-Existing Condition(s):     Assessment:   Condition Improved    Status: Additional Care Required  Permanent Disability:No    Plan:   Comments:relative rest, nsaid, knee sleeve as needed, initated PT    Diagnostics:      Comments:       Disability Information   Status: Released to Restricted Duty    From:  2021  Through: 2021 Restrictions are: Temporary   Physical Restrictions   Sitting:    Standing:  < or = to 6 hrs/day Stooping:    Bending:  < or = to 2 hrs/day   Squattin hrs/day Walking:  < or = to 4 hrs/day Climbing:    Pushing:      Pulling:    Other:    Reaching Above Shoulder (L):   Reaching Above Shoulder (R):       Reaching Below Shoulder (L):    Reaching Below Shoulder (R):      Not to  exceed Weight Limits   Carrying(hrs):   Weight Limit(lb): < or = to 25 pounds Lifting(hrs):   Weight  Limit(lb): < or = to 25 pounds   Comments: Please allow knee brace.      Repetitive Actions   Hands: i.e. Fine Manipulations from Grasping:     Feet: i.e. Operating Foot Controls:     Driving / Operate Machinery:     Provider Name:   Mac Penny M.D. Physician Signature:  Physician Name:     Clinic Name / Location: [address],   [address] [place] [address] Clinic Phone Number: Dept: [phone]   Appointment Time: 3:30 Pm Visit Start Time: 3:46 PM   Check-In Time:  3:24 Pm Visit Discharge Time: 4:25 PM    Original-Treating Physician or Chiropractor    Page 2-Insurer/TPA    Page 3-Employer    Page 4-Employee

## 2021-07-17 NOTE — PROGRESS NOTES
"Subjective:      Jenni Garcia is a 35 y.o. female who presents with Follow-Up ([facility] DOI 5/3/21 Lt knee, same, rm 18)      DOI: 5/3/21  F/u visit left knee strain. SARAI: twisting mechanism when stepping down from ladder. Overall she feels 60% improved. Pain waxes and wanes. Worse with walking for hours. Intermittent locking and swelling. No prior injury or surgery. OTC advil is helpful. She has used a hinged brace but feels a sleeve will be more helpful.      HPI    Review of Systems   Constitutional: Negative for fever.   Skin: Negative for itching and rash.   Neurological: Negative for sensory change and focal weakness.          Objective:     /68   Pulse 86   Resp 16   Ht 1.6 m (5' 3\")   Wt 88.9 kg (196 lb)   SpO2 97%   BMI 34.72 kg/m²      Physical Exam  Constitutional:       Appearance: Normal appearance.   Neurological:      General: No focal deficit present.      Mental Status: She is alert and oriented to person, place, and time.         Left knee: area of perceived pain anterior aspect, tender superior patella, no joint line tenderness, full range of motion, no effusion, no instability. Distal neuro/vascular intact.                 Assessment/Plan:        1. Knee strain, left, subsequent encounter    - REFERRAL TO PHYSICAL THERAPY    Differential diagnosis, natural history, supportive care, and indications for immediate follow-up discussed at length.     Relative rest, ice, OTC NSAID.  Knee sleeve placed.  Initiate physical therapy and follow-up in 3 weeks.    "

## 2021-07-27 NOTE — ED NOTES
"Pt discharged home w/ ride, pt had ride set up while in room. Given dental referral sheet. Pt A&Ox4, steady gait, pt in possession of belongings. Pt provided discharge education and information pertaining to medications and follow up appointments. Pt received copy of discharge instructions and verbalized understanding. /69   Pulse 64   Temp 36.5 °C (97.7 °F) (Temporal)   Resp 18   Ht 1.6 m (5' 3\")   Wt 96 kg (211 lb 10.3 oz)   SpO2 97%   BMI 37.49 kg/m²   " NuShield Treatment Note    NAME:  Anastacio Kaur  YOB: 1958  MEDICAL RECORD NUMBER:  [id number]  DATE:  7/27/2021    Goal:  Patient will receive safe and proper application of skin substitute. Patient will comply with caring for dressing, offloading and reporting complications. Expiration date checked immediately prior to use. Package intact prior to use and no damage noted. Nushield was removed from protective sterile packaging by provider and applied to prepared ulcer bed. NuShield applied with notch to Top Upper Right Corner. NuShield was hydrated with sterile normal saline per provider. NuShield was applied to left leg and affixed with steri-strips by the provider. Applied nonadherent and TLC wrap (Secondary Dressing)   Coban or ace wrap was applied to secure graft and decrease edema. Patient/caregiver was instructed not to remove dressing and to keep it clean and dry. NuShield may be applied a total of 10 times per wound over a 12 week period. Additionally NuShield may only be used every 12 months per wound. Date of first application of NuShield for this current wound is June 30, 2021.       Application # 5 out of 10   2x3 cm applied to left leg            Guidelines followed    Electronically signed by Bony Woodard RN on 7/27/2021 at 9:44 AM

## 2021-08-06 ENCOUNTER — OCCUPATIONAL MEDICINE (OUTPATIENT)
Dept: OCCUPATIONAL MEDICINE | Facility: CLINIC | Age: 36
End: 2021-08-06
Payer: COMMERCIAL

## 2021-08-06 VITALS
DIASTOLIC BLOOD PRESSURE: 78 MMHG | SYSTOLIC BLOOD PRESSURE: 124 MMHG | OXYGEN SATURATION: 96 % | WEIGHT: 189 LBS | HEIGHT: 63 IN | RESPIRATION RATE: 16 BRPM | BODY MASS INDEX: 33.49 KG/M2 | HEART RATE: 75 BPM

## 2021-08-06 DIAGNOSIS — S86.912D KNEE STRAIN, LEFT, SUBSEQUENT ENCOUNTER: ICD-10-CM

## 2021-08-06 PROCEDURE — 99202 OFFICE O/P NEW SF 15 MIN: CPT | Performed by: PREVENTIVE MEDICINE

## 2021-08-06 ASSESSMENT — FIBROSIS 4 INDEX: FIB4 SCORE: 0.38

## 2021-08-06 NOTE — LETTER
[address],   [address] [place] [address]  Phone:  [phone] - Fax:  [phone]   [facility] Progress Report and Disability Certification  Date of Service: 8/6/2021   No Show:  No  Date / Time of Next Visit: 9/8/2021@ 3:15pm   Claim Information   Patient Name: Jenni Garcia  Claim Number:     Employer: [employer]  Date of Injury: 5/3/2021     Insurer / TPA: Zev  ID / SSN:     Occupation:   Diagnosis: The encounter diagnosis was Knee strain, left, subsequent encounter.    Medical Information   Related to Industrial Injury? 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 SARAI: twisting mechanism when stepping down from ladder.  Patient states overall she has had some improvement in symptoms.  She states the pain is mild for the most part with increasing pain with certain activities.  However she feels that she can do her full duty job at this point.  She notes only occasional instability.  She is able to walk normally has some discomfort with squatting.   Objective Findings: Left knee: No gross deformity.  Mild tenderness over the lateral joint line.  Full range of motion.  Anterior/posterior drawer test negative.  No laxity with varus or valgus stress.  Normal gait.  Able to squat with minimal difficulty   Pre-Existing Condition(s):     Assessment:   Condition Improved    Status: Additional Care Required  Permanent Disability:No    Plan:      Diagnostics:      Comments:  Referral to physical therapy approved recommend scheduling  OTC ibuprofen as needed  Knee brace as needed  Full duty  Follow-up 1 month    Disability Information   Status: Released to Full Duty    From:  8/6/2021  Through: 9/8/2021 Restrictions are:     Physical Restrictions   Sitting:    Standing:    Stooping:    Bending:      Squatting:    Walking:    Climbing:    Pushing:         Pulling:    Other:    Reaching Above Shoulder (L):   Reaching Above Shoulder (R):       Reaching Below Shoulder (L):    Reaching Below Shoulder (R):      Not to exceed Weight Limits   Carrying(hrs):   Weight Limit(lb):   Lifting(hrs):   Weight  Limit(lb):     Comments:      Repetitive Actions   Hands: i.e. Fine Manipulations from Grasping:     Feet: i.e. Operating Foot Controls:     Driving / Operate Machinery:     Provider Name:   Sage Neri D.O. Physician Signature:  Physician Name:     Clinic Name / Location: [address],   [address] [place] [address] Clinic Phone Number: Dept: [phone]   Appointment Time: 3:30 Pm Visit Start Time: 3:05 PM   Check-In Time:  3:01 Pm Visit Discharge Time:  3:25pm   Original-Treating Physician or Chiropractor    Page 2-Insurer/TPA    Page 3-Employer    Page 4-Employee

## 2021-08-06 NOTE — PROGRESS NOTES
"Subjective:     Jenni Garcia is a 35 y.o. 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 SARAI: twisting mechanism when stepping down from ladder.  Patient states overall she has had some improvement in symptoms.  She states the pain is mild for the most part with increasing pain with certain activities.  However she feels that she can do her full duty job at this point.  She notes only occasional instability.  She is able to walk normally has some discomfort with squatting.    ROS: All systems were reviewed on intake form, form was reviewed and signed. See scanned documents in media. Pertinent positives and negatives included in HPI.    PMH: No pertinent past medical history to this problem  MEDS: Medications were reviewed in Epic  ALLERGIES:   Allergies   Allergen Reactions   • Pollen Extract      \"Eyes water,can't breathe\"     SOCHX: Works as a  at [employer]  FH: No pertinent family history to this problem       Objective:     /78   Pulse 75   Resp 16   Ht 1.6 m (5' 3\")   Wt 85.7 kg (189 lb)   SpO2 96%   BMI 33.48 kg/m²     Constitutional: Patient is in no acute distress. Appears well-developed and well-nourished.   HENT: Normocephalic and atraumatic. EOM are normal. No scleral icterus.   Cardiovascular: Normal rate.    Pulmonary/Chest: Effort normal. No respiratory distress.   Neurological: Patient is alert and oriented to person, place, and time.   Skin: Skin is warm and dry.   Psychiatric: Normal mood and affect. Behavior is normal.     Left knee: No gross deformity.  Mild tenderness over the lateral joint line.  Full range of motion.  Anterior/posterior drawer test negative.  No laxity with varus or valgus stress.  Normal gait.  Able to squat with minimal difficulty    Assessment/Plan:       1. Knee strain, left, subsequent encounter    Released to Full Duty " FROM 8/6/2021 TO 9/8/2021     Referral to physical therapy approved recommend scheduling  OTC ibuprofen as needed  Knee brace as needed  Full duty  Follow-up 1 month    Differential diagnosis, natural history, supportive care, and indications for immediate follow-up discussed.    Approximately 25 minutes were spent in reviewing notes, preparing for visit, obtaining history, exam and evaluation, patient counseling/education and post visit documentation/orders.

## 2021-10-08 ENCOUNTER — HOSPITAL ENCOUNTER (EMERGENCY)
Facility: MEDICAL CENTER | Age: 36
End: 2021-10-08
Attending: EMERGENCY MEDICINE
Payer: MEDICAID

## 2021-10-08 VITALS
RESPIRATION RATE: 16 BRPM | BODY MASS INDEX: 33.01 KG/M2 | SYSTOLIC BLOOD PRESSURE: 134 MMHG | HEIGHT: 63 IN | HEART RATE: 75 BPM | TEMPERATURE: 97.8 F | DIASTOLIC BLOOD PRESSURE: 85 MMHG | WEIGHT: 186.29 LBS | OXYGEN SATURATION: 99 %
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
## 2021-10-08 DIAGNOSIS — S13.4XXA WHIPLASH INJURY TO NECK, INITIAL ENCOUNTER: ICD-10-CM

## 2021-10-08 PROCEDURE — 99284 EMERGENCY DEPT VISIT MOD MDM: CPT | Mod: EDC

## 2021-10-08 RX ORDER — NAPROXEN 500 MG/1
500 TABLET ORAL 2 TIMES DAILY WITH MEALS
Qty: 20 TABLET | Refills: 0 | Status: SHIPPED | OUTPATIENT
Start: 2021-10-08 | End: 2022-06-01

## 2021-10-08 RX ORDER — CYCLOBENZAPRINE HCL 10 MG
10 TABLET ORAL 3 TIMES DAILY PRN
Qty: 20 TABLET | Refills: 0 | Status: SHIPPED | OUTPATIENT
Start: 2021-10-08 | End: 2022-06-01

## 2021-10-08 ASSESSMENT — FIBROSIS 4 INDEX: FIB4 SCORE: 0.38

## 2021-10-08 NOTE — ED NOTES
Introduction to pt. Triage note reviewed and agreed with. History obtained. Pt assessment completed. Call light within reach, no additional needs at this time. Chart up for ERP - will continue to assess.
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
## 2021-10-09 NOTE — ED NOTES
Jenni Jensen D/C'd.  Discharge instructions including s/s to return to ED, follow up appointments, hydration importance and cervical sprain info provided to pt.    Pt verbalized understanding with no further questions and concerns.    Copy of discharge provided to pt.  Signed copy in chart.    Prescription for Flexeril and Naproxen provided to pt.   Pt walked out of department; pt in NAD, awake, alert, interactive and age appropriate.

## 2021-12-22 ENCOUNTER — HOSPITAL ENCOUNTER (EMERGENCY)
Facility: MEDICAL CENTER | Age: 36
End: 2021-12-22
Attending: EMERGENCY MEDICINE
Payer: MEDICAID

## 2021-12-22 ENCOUNTER — APPOINTMENT (OUTPATIENT)
Dept: RADIOLOGY | Facility: MEDICAL CENTER | Age: 36
End: 2021-12-22
Attending: EMERGENCY MEDICINE
Payer: MEDICAID

## 2021-12-22 VITALS
HEART RATE: 78 BPM | TEMPERATURE: 98.4 F | BODY MASS INDEX: 24.98 KG/M2 | DIASTOLIC BLOOD PRESSURE: 58 MMHG | OXYGEN SATURATION: 95 % | SYSTOLIC BLOOD PRESSURE: 109 MMHG | HEIGHT: 65 IN | RESPIRATION RATE: 18 BRPM | WEIGHT: 149.91 LBS

## 2021-12-22 DIAGNOSIS — R07.89 ATYPICAL CHEST PAIN: ICD-10-CM

## 2021-12-22 DIAGNOSIS — R51.9 ACUTE NONINTRACTABLE HEADACHE, UNSPECIFIED HEADACHE TYPE: ICD-10-CM

## 2021-12-22 DIAGNOSIS — B34.9 NONSPECIFIC SYNDROME SUGGESTIVE OF VIRAL ILLNESS: ICD-10-CM

## 2021-12-22 LAB
ALBUMIN SERPL BCP-MCNC: 3.9 G/DL (ref 3.2–4.9)
ALBUMIN/GLOB SERPL: 1.6 G/DL
ALP SERPL-CCNC: 46 U/L (ref 30–99)
ALT SERPL-CCNC: 12 U/L (ref 2–50)
ANION GAP SERPL CALC-SCNC: 8 MMOL/L (ref 7–16)
AST SERPL-CCNC: 14 U/L (ref 12–45)
BASOPHILS # BLD AUTO: 0.8 % (ref 0–1.8)
BASOPHILS # BLD: 0.04 K/UL (ref 0–0.12)
BILIRUB SERPL-MCNC: 0.3 MG/DL (ref 0.1–1.5)
BUN SERPL-MCNC: 12 MG/DL (ref 8–22)
CALCIUM SERPL-MCNC: 8.7 MG/DL (ref 8.5–10.5)
CHLORIDE SERPL-SCNC: 108 MMOL/L (ref 96–112)
CO2 SERPL-SCNC: 22 MMOL/L (ref 20–33)
CREAT SERPL-MCNC: 0.44 MG/DL (ref 0.5–1.4)
EKG IMPRESSION: NORMAL
EOSINOPHIL # BLD AUTO: 0.6 K/UL (ref 0–0.51)
EOSINOPHIL NFR BLD: 11.4 % (ref 0–6.9)
ERYTHROCYTE [DISTWIDTH] IN BLOOD BY AUTOMATED COUNT: 47.9 FL (ref 35.9–50)
FLUAV RNA SPEC QL NAA+PROBE: NEGATIVE
FLUBV RNA SPEC QL NAA+PROBE: NEGATIVE
GLOBULIN SER CALC-MCNC: 2.5 G/DL (ref 1.9–3.5)
GLUCOSE SERPL-MCNC: 110 MG/DL (ref 65–99)
HCG SERPL QL: NEGATIVE
HCT VFR BLD AUTO: 40.1 % (ref 37–47)
HGB BLD-MCNC: 13.1 G/DL (ref 12–16)
IMM GRANULOCYTES # BLD AUTO: 0.02 K/UL (ref 0–0.11)
IMM GRANULOCYTES NFR BLD AUTO: 0.4 % (ref 0–0.9)
LYMPHOCYTES # BLD AUTO: 1.39 K/UL (ref 1–4.8)
LYMPHOCYTES NFR BLD: 26.4 % (ref 22–41)
MCH RBC QN AUTO: 31.6 PG (ref 27–33)
MCHC RBC AUTO-ENTMCNC: 32.7 G/DL (ref 33.6–35)
MCV RBC AUTO: 96.9 FL (ref 81.4–97.8)
MONOCYTES # BLD AUTO: 0.59 K/UL (ref 0–0.85)
MONOCYTES NFR BLD AUTO: 11.2 % (ref 0–13.4)
NEUTROPHILS # BLD AUTO: 2.63 K/UL (ref 2–7.15)
NEUTROPHILS NFR BLD: 49.8 % (ref 44–72)
NRBC # BLD AUTO: 0 K/UL
NRBC BLD-RTO: 0 /100 WBC
PLATELET # BLD AUTO: 381 K/UL (ref 164–446)
PMV BLD AUTO: 9.8 FL (ref 9–12.9)
POTASSIUM SERPL-SCNC: 4.2 MMOL/L (ref 3.6–5.5)
PROT SERPL-MCNC: 6.4 G/DL (ref 6–8.2)
RBC # BLD AUTO: 4.14 M/UL (ref 4.2–5.4)
RSV RNA SPEC QL NAA+PROBE: NEGATIVE
SARS-COV-2 RNA RESP QL NAA+PROBE: NOTDETECTED
SODIUM SERPL-SCNC: 138 MMOL/L (ref 135–145)
SPECIMEN SOURCE: NORMAL
TROPONIN T SERPL-MCNC: <6 NG/L (ref 6–19)
WBC # BLD AUTO: 5.3 K/UL (ref 4.8–10.8)

## 2021-12-22 PROCEDURE — 700111 HCHG RX REV CODE 636 W/ 250 OVERRIDE (IP): Performed by: EMERGENCY MEDICINE

## 2021-12-22 PROCEDURE — 71045 X-RAY EXAM CHEST 1 VIEW: CPT

## 2021-12-22 PROCEDURE — 0241U HCHG SARS-COV-2 COVID-19 NFCT DS RESP RNA 4 TRGT MIC: CPT

## 2021-12-22 PROCEDURE — C9803 HOPD COVID-19 SPEC COLLECT: HCPCS | Performed by: EMERGENCY MEDICINE

## 2021-12-22 PROCEDURE — 84484 ASSAY OF TROPONIN QUANT: CPT

## 2021-12-22 PROCEDURE — 99284 EMERGENCY DEPT VISIT MOD MDM: CPT

## 2021-12-22 PROCEDURE — 93005 ELECTROCARDIOGRAM TRACING: CPT | Performed by: EMERGENCY MEDICINE

## 2021-12-22 PROCEDURE — 96374 THER/PROPH/DIAG INJ IV PUSH: CPT

## 2021-12-22 PROCEDURE — 85025 COMPLETE CBC W/AUTO DIFF WBC: CPT

## 2021-12-22 PROCEDURE — 80053 COMPREHEN METABOLIC PANEL: CPT

## 2021-12-22 PROCEDURE — 96375 TX/PRO/DX INJ NEW DRUG ADDON: CPT

## 2021-12-22 PROCEDURE — 84703 CHORIONIC GONADOTROPIN ASSAY: CPT

## 2021-12-22 PROCEDURE — 93005 ELECTROCARDIOGRAM TRACING: CPT

## 2021-12-22 RX ORDER — DIPHENHYDRAMINE HYDROCHLORIDE 50 MG/ML
25 INJECTION INTRAMUSCULAR; INTRAVENOUS ONCE
Status: COMPLETED | OUTPATIENT
Start: 2021-12-22 | End: 2021-12-22

## 2021-12-22 RX ORDER — METOCLOPRAMIDE HYDROCHLORIDE 5 MG/ML
10 INJECTION INTRAMUSCULAR; INTRAVENOUS ONCE
Status: COMPLETED | OUTPATIENT
Start: 2021-12-22 | End: 2021-12-22

## 2021-12-22 RX ORDER — KETOROLAC TROMETHAMINE 30 MG/ML
30 INJECTION, SOLUTION INTRAMUSCULAR; INTRAVENOUS ONCE
Status: COMPLETED | OUTPATIENT
Start: 2021-12-22 | End: 2021-12-22

## 2021-12-22 RX ADMIN — METOCLOPRAMIDE 10 MG: 5 INJECTION, SOLUTION INTRAMUSCULAR; INTRAVENOUS at 08:52

## 2021-12-22 RX ADMIN — DIPHENHYDRAMINE HYDROCHLORIDE 25 MG: 50 INJECTION INTRAMUSCULAR; INTRAVENOUS at 08:48

## 2021-12-22 RX ADMIN — KETOROLAC TROMETHAMINE 30 MG: 30 INJECTION, SOLUTION INTRAMUSCULAR; INTRAVENOUS at 08:50

## 2021-12-22 ASSESSMENT — FIBROSIS 4 INDEX: FIB4 SCORE: 0.39

## 2021-12-22 NOTE — ED NOTES
Pt awake, sitting up in bed, speaking without signs of distress. States understanding of discharge instruction.
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
## 2021-12-30 ENCOUNTER — OFFICE VISIT (OUTPATIENT)
Dept: MEDICAL GROUP | Facility: MEDICAL CENTER | Age: 36
End: 2021-12-30
Attending: PHYSICIAN ASSISTANT
Payer: MEDICAID

## 2021-12-30 VITALS
OXYGEN SATURATION: 98 % | SYSTOLIC BLOOD PRESSURE: 100 MMHG | HEIGHT: 63 IN | TEMPERATURE: 98 F | DIASTOLIC BLOOD PRESSURE: 80 MMHG | HEART RATE: 61 BPM | WEIGHT: 188.7 LBS | BODY MASS INDEX: 33.43 KG/M2 | RESPIRATION RATE: 18 BRPM

## 2021-12-30 DIAGNOSIS — J45.20 MILD INTERMITTENT ASTHMA WITHOUT COMPLICATION: ICD-10-CM

## 2021-12-30 DIAGNOSIS — G44.229 CHRONIC TENSION-TYPE HEADACHE, NOT INTRACTABLE: ICD-10-CM

## 2021-12-30 DIAGNOSIS — M25.552 BILATERAL HIP PAIN: ICD-10-CM

## 2021-12-30 DIAGNOSIS — M25.551 BILATERAL HIP PAIN: ICD-10-CM

## 2021-12-30 PROCEDURE — 99214 OFFICE O/P EST MOD 30 MIN: CPT | Performed by: PHYSICIAN ASSISTANT

## 2021-12-30 PROCEDURE — 99213 OFFICE O/P EST LOW 20 MIN: CPT | Performed by: PHYSICIAN ASSISTANT

## 2021-12-30 RX ORDER — BUTALBITAL, ACETAMINOPHEN AND CAFFEINE 50; 325; 40 MG/1; MG/1; MG/1
1 TABLET ORAL EVERY 6 HOURS PRN
Qty: 60 TABLET | Refills: 0 | Status: SHIPPED | OUTPATIENT
Start: 2021-12-30 | End: 2022-01-29

## 2021-12-30 RX ORDER — BUTALBITAL, ACETAMINOPHEN AND CAFFEINE 50; 325; 40 MG/1; MG/1; MG/1
1 TABLET ORAL EVERY 6 HOURS PRN
Qty: 60 TABLET | Refills: 0 | Status: SHIPPED | OUTPATIENT
Start: 2021-12-30 | End: 2021-12-30

## 2021-12-30 RX ORDER — ALBUTEROL SULFATE 90 UG/1
2 AEROSOL, METERED RESPIRATORY (INHALATION) EVERY 6 HOURS PRN
Qty: 8.5 G | Refills: 11 | Status: SHIPPED | OUTPATIENT
Start: 2021-12-30 | End: 2022-06-01 | Stop reason: SDUPTHER

## 2021-12-30 ASSESSMENT — FIBROSIS 4 INDEX: FIB4 SCORE: 0.4

## 2021-12-30 NOTE — ASSESSMENT & PLAN NOTE
Jenni presents today for follow-up.  She reports for the past several weeks she has been experiencing chronic headaches.  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  She also feels that her decreased vision may be causing her current headaches as she does feel as though she is straining to see.  She has a appointment scheduled with her optometrist for further evaluation.

## 2021-12-30 NOTE — ASSESSMENT & PLAN NOTE
"Onset/SARAI: 2 months.   Location: Bilateral anterior hip.  Duration: Constant.   Character: \"feels like the legs are going to rip open.\"  Alleviating factors: Propping up her hips while sleeping.   Aggravating factors: sitting for long period of times, climbing a ladder, lying on her back.  Radiation: None.   Treatments tried: None.  No red flags.  "

## 2021-12-30 NOTE — PROGRESS NOTES
"Chief Complaint   Patient presents with   • Follow-Up     Subjective:     HPI:   Jenni Jensen is a 36 y.o. female here to discuss the evaluation and management of:    Bilateral hip pain  Onset/SARAI: 2 months.   Location: Bilateral anterior hip.  Duration: Constant.   Character: \"feels like the legs are going to rip open.\"  Alleviating factors: Propping up her hips while sleeping.   Aggravating factors: sitting for long period of times, climbing a ladder, lying on her back.  Radiation: None.   Treatments tried: None.  No red flags.    Chronic tension-type headache, not intractable  Jenni presents today for follow-up.  She reports for the past several weeks she has been experiencing chronic headaches.  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  She also feels that her decreased vision may be causing her current headaches as she does feel as though she is straining to see.  She has a appointment scheduled with her optometrist for further evaluation.      ROS  See HPI.    Allergies   Allergen Reactions   • Pollen Extract      \"Eyes water,can't breathe\"       Current medicines (including changes today)  Current Outpatient Medications   Medication Sig Dispense Refill   • butalbital/apap/caffeine -40 mg (FIORICET) -40 MG Tab Take 1 Tablet by mouth every 6 hours as needed for Headache for up to 30 days. 60 Tablet 0   • albuterol 108 (90 Base) MCG/ACT Aero Soln inhalation aerosol Inhale 2 Puffs every 6 hours as needed for Shortness of Breath. 8.5 g 11   • naproxen (NAPROSYN) 500 MG Tab Take 1 Tablet by mouth 2 times a day with meals. 20 Tablet 0   • cyclobenzaprine (FLEXERIL) 10 mg Tab Take 1 Tablet by mouth 3 times a day as needed. 20 Tablet 0     No current facility-administered medications for this visit.       Social History     Tobacco Use   • Smoking status: Current Some Day Smoker     " "Years: 10.00     Types: Cigarettes   • Smokeless tobacco: Never Used   • Tobacco comment: about 2 cigarettes a week.    Vaping Use   • Vaping Use: Never used   Substance Use Topics   • Alcohol use: Yes     Comment: 1-2 beers 3 x per week   • Drug use: Yes     Comment: marijuana daily       Patient Active Problem List    Diagnosis Date Noted   • Bilateral hip pain 12/30/2021   • Chronic tension-type headache, not intractable 12/30/2021   • Mixed hyperlipidemia 11/24/2020   • Bilateral foot pain 10/20/2020   • Mild intermittent asthma without complication 07/01/2015   • H/O tubal ligation 07/01/2015   • Obesity (BMI 30-39.9) 07/01/2015   • Migraine with aura and without status migrainosus, not intractable 07/01/2015   • Eczema 07/01/2015   • Problems related to high-risk sexual behavior 07/01/2015   • History of drug abuse (HCC) 07/01/2015       Family History   Problem Relation Age of Onset   • Stroke Mother    • Psychiatric Illness Mother    • Asthma Father    • Hypertension Father    • Hyperlipidemia Father    • Heart Disease Father    • Asthma Sister    • Diabetes Maternal Grandmother    • Diabetes Maternal Grandfather    • Allergies Son    • Asthma Son    • Allergies Son    • Allergies Daughter         Objective:     /80 (BP Location: Left arm, Patient Position: Sitting, BP Cuff Size: Adult)   Pulse 61   Temp 36.7 °C (98 °F) (Skin)   Resp 18   Ht 1.6 m (5' 3\")   Wt 85.6 kg (188 lb 11.2 oz)   SpO2 98%  Body mass index is 33.43 kg/m².    Physical Exam:  Physical Exam  Vitals reviewed.   Constitutional:       Appearance: Normal appearance.   HENT:      Head: Normocephalic.      Right Ear: External ear normal.      Left Ear: External ear normal.   Eyes:      Pupils: Pupils are equal, round, and reactive to light.   Cardiovascular:      Rate and Rhythm: Normal rate and regular rhythm.      Heart sounds: Normal heart sounds.   Pulmonary:      Effort: Pulmonary effort is normal.      Breath sounds: Normal " breath sounds.   Musculoskeletal:      Cervical back: Normal range of motion.      Right hip: Tenderness present. Decreased range of motion.      Left hip: Tenderness present. Decreased range of motion.   Neurological:      Mental Status: She is alert.   Psychiatric:         Mood and Affect: Affect is tearful.       Assessment and Plan:     The following treatment plan was discussed:    1. Bilateral hip pain  - This is a chronic condition.  - Plan: Obtain plain films of the pelvis for further evaluation.  If plain films come back normal we will consider trial of physical therapy.  - DX-HIP-BILATERAL-WITH PELVIS-3/4 VIEWS; Future    2. Chronic tension-type headache, not intractable  - This is a chronic condition.  - Likely secondary to stress and strain vision.  - Plan: Start in CBT as discussed.  We will also trial a medication to help give symptomatic relief.  Patient has been educated on the use and potential side effect profile of this medication.  Follow-up in 4 weeks for reevaluation.  - butalbital/apap/caffeine -40 mg (FIORICET) -40 MG Tab; Take 1 Tablet by mouth every 6 hours as needed for Headache for up to 30 days.  Dispense: 60 Tablet; Refill: 0    3. Mild intermittent asthma without complication  - albuterol 108 (90 Base) MCG/ACT Aero Soln inhalation aerosol; Inhale 2 Puffs every 6 hours as needed for Shortness of Breath.  Dispense: 8.5 g; Refill: 11     Any change or worsening of signs or symptoms, patient encouraged to follow-up or report to emergency room for further evaluation. Patient verbalizes understanding and agrees.    Follow-Up: Return in about 4 weeks (around 1/27/2022) for Med check.      PLEASE NOTE: This dictation was created using voice recognition software. I have made every reasonable attempt to correct obvious errors, but I expect that there are errors of grammar and possibly content that I did not discover before finalizing the note.

## 2022-03-22 ENCOUNTER — HOSPITAL ENCOUNTER (EMERGENCY)
Facility: MEDICAL CENTER | Age: 37
End: 2022-03-22
Attending: EMERGENCY MEDICINE
Payer: COMMERCIAL

## 2022-03-22 VITALS
RESPIRATION RATE: 14 BRPM | WEIGHT: 192 LBS | HEIGHT: 63 IN | BODY MASS INDEX: 34.02 KG/M2 | SYSTOLIC BLOOD PRESSURE: 106 MMHG | TEMPERATURE: 96.8 F | OXYGEN SATURATION: 97 % | HEART RATE: 64 BPM | DIASTOLIC BLOOD PRESSURE: 57 MMHG

## 2022-03-22 DIAGNOSIS — N30.90 CYSTITIS: ICD-10-CM

## 2022-03-22 DIAGNOSIS — R11.2 NAUSEA AND VOMITING, INTRACTABILITY OF VOMITING NOT SPECIFIED, UNSPECIFIED VOMITING TYPE: ICD-10-CM

## 2022-03-22 DIAGNOSIS — R19.7 DIARRHEA, UNSPECIFIED TYPE: ICD-10-CM

## 2022-03-22 LAB
ALBUMIN SERPL BCP-MCNC: 4.1 G/DL (ref 3.2–4.9)
ALBUMIN/GLOB SERPL: 1.5 G/DL
ALP SERPL-CCNC: 52 U/L (ref 30–99)
ALT SERPL-CCNC: 11 U/L (ref 2–50)
AMORPH CRY #/AREA URNS HPF: PRESENT /HPF
AMORPH CRY #/AREA URNS HPF: PRESENT /HPF
ANION GAP SERPL CALC-SCNC: 9 MMOL/L (ref 7–16)
APPEARANCE UR: ABNORMAL
APPEARANCE UR: ABNORMAL
AST SERPL-CCNC: 17 U/L (ref 12–45)
BACTERIA #/AREA URNS HPF: ABNORMAL /HPF
BACTERIA #/AREA URNS HPF: ABNORMAL /HPF
BASOPHILS # BLD AUTO: 0.4 % (ref 0–1.8)
BASOPHILS # BLD: 0.02 K/UL (ref 0–0.12)
BILIRUB SERPL-MCNC: 0.5 MG/DL (ref 0.1–1.5)
BILIRUB UR QL STRIP.AUTO: NEGATIVE
BILIRUB UR QL STRIP.AUTO: NEGATIVE
BUN SERPL-MCNC: 13 MG/DL (ref 8–22)
CALCIUM SERPL-MCNC: 9.1 MG/DL (ref 8.5–10.5)
CHLORIDE SERPL-SCNC: 105 MMOL/L (ref 96–112)
CO2 SERPL-SCNC: 23 MMOL/L (ref 20–33)
COLOR UR: ABNORMAL
COLOR UR: YELLOW
CREAT SERPL-MCNC: 0.63 MG/DL (ref 0.5–1.4)
EKG IMPRESSION: NORMAL
EOSINOPHIL # BLD AUTO: 0.21 K/UL (ref 0–0.51)
EOSINOPHIL NFR BLD: 4.4 % (ref 0–6.9)
EPI CELLS #/AREA URNS HPF: ABNORMAL /HPF
EPI CELLS #/AREA URNS HPF: ABNORMAL /HPF
ERYTHROCYTE [DISTWIDTH] IN BLOOD BY AUTOMATED COUNT: 45.8 FL (ref 35.9–50)
GFR SERPLBLD CREATININE-BSD FMLA CKD-EPI: 116 ML/MIN/1.73 M 2
GLOBULIN SER CALC-MCNC: 2.7 G/DL (ref 1.9–3.5)
GLUCOSE SERPL-MCNC: 117 MG/DL (ref 65–99)
GLUCOSE UR STRIP.AUTO-MCNC: NEGATIVE MG/DL
GLUCOSE UR STRIP.AUTO-MCNC: NEGATIVE MG/DL
HCG SERPL QL: NEGATIVE
HCT VFR BLD AUTO: 45.3 % (ref 37–47)
HGB BLD-MCNC: 14.6 G/DL (ref 12–16)
HYALINE CASTS #/AREA URNS LPF: ABNORMAL /LPF
HYALINE CASTS #/AREA URNS LPF: ABNORMAL /LPF
IMM GRANULOCYTES # BLD AUTO: 0.02 K/UL (ref 0–0.11)
IMM GRANULOCYTES NFR BLD AUTO: 0.4 % (ref 0–0.9)
KETONES UR STRIP.AUTO-MCNC: ABNORMAL MG/DL
KETONES UR STRIP.AUTO-MCNC: ABNORMAL MG/DL
LEUKOCYTE ESTERASE UR QL STRIP.AUTO: ABNORMAL
LEUKOCYTE ESTERASE UR QL STRIP.AUTO: ABNORMAL
LIPASE SERPL-CCNC: 11 U/L (ref 11–82)
LYMPHOCYTES # BLD AUTO: 1.22 K/UL (ref 1–4.8)
LYMPHOCYTES NFR BLD: 25.4 % (ref 22–41)
MCH RBC QN AUTO: 31.1 PG (ref 27–33)
MCHC RBC AUTO-ENTMCNC: 32.2 G/DL (ref 33.6–35)
MCV RBC AUTO: 96.6 FL (ref 81.4–97.8)
MICRO URNS: ABNORMAL
MICRO URNS: ABNORMAL
MONOCYTES # BLD AUTO: 0.63 K/UL (ref 0–0.85)
MONOCYTES NFR BLD AUTO: 13.1 % (ref 0–13.4)
NEUTROPHILS # BLD AUTO: 2.7 K/UL (ref 2–7.15)
NEUTROPHILS NFR BLD: 56.3 % (ref 44–72)
NITRITE UR QL STRIP.AUTO: NEGATIVE
NITRITE UR QL STRIP.AUTO: NEGATIVE
NRBC # BLD AUTO: 0 K/UL
NRBC BLD-RTO: 0 /100 WBC
PH UR STRIP.AUTO: 5.5 [PH] (ref 5–8)
PH UR STRIP.AUTO: 6 [PH] (ref 5–8)
PLATELET # BLD AUTO: 369 K/UL (ref 164–446)
PMV BLD AUTO: 9.4 FL (ref 9–12.9)
POTASSIUM SERPL-SCNC: 3.8 MMOL/L (ref 3.6–5.5)
PROT SERPL-MCNC: 6.8 G/DL (ref 6–8.2)
PROT UR QL STRIP: 30 MG/DL
PROT UR QL STRIP: NEGATIVE MG/DL
RBC # BLD AUTO: 4.69 M/UL (ref 4.2–5.4)
RBC # URNS HPF: ABNORMAL /HPF
RBC # URNS HPF: ABNORMAL /HPF
RBC UR QL AUTO: ABNORMAL
RBC UR QL AUTO: ABNORMAL
SARS-COV-2 RNA RESP QL NAA+PROBE: NOTDETECTED
SODIUM SERPL-SCNC: 137 MMOL/L (ref 135–145)
SP GR UR STRIP.AUTO: 1.03
SP GR UR STRIP.AUTO: 1.03
SPECIMEN SOURCE: NORMAL
UROBILINOGEN UR STRIP.AUTO-MCNC: 1 MG/DL
UROBILINOGEN UR STRIP.AUTO-MCNC: 1 MG/DL
WBC # BLD AUTO: 4.8 K/UL (ref 4.8–10.8)
WBC #/AREA URNS HPF: ABNORMAL /HPF
WBC #/AREA URNS HPF: ABNORMAL /HPF

## 2022-03-22 PROCEDURE — 700102 HCHG RX REV CODE 250 W/ 637 OVERRIDE(OP): Performed by: EMERGENCY MEDICINE

## 2022-03-22 PROCEDURE — 81001 URINALYSIS AUTO W/SCOPE: CPT

## 2022-03-22 PROCEDURE — A9270 NON-COVERED ITEM OR SERVICE: HCPCS | Performed by: EMERGENCY MEDICINE

## 2022-03-22 PROCEDURE — U0003 INFECTIOUS AGENT DETECTION BY NUCLEIC ACID (DNA OR RNA); SEVERE ACUTE RESPIRATORY SYNDROME CORONAVIRUS 2 (SARS-COV-2) (CORONAVIRUS DISEASE [COVID-19]), AMPLIFIED PROBE TECHNIQUE, MAKING USE OF HIGH THROUGHPUT TECHNOLOGIES AS DESCRIBED BY CMS-2020-01-R: HCPCS

## 2022-03-22 PROCEDURE — 93005 ELECTROCARDIOGRAM TRACING: CPT | Performed by: EMERGENCY MEDICINE

## 2022-03-22 PROCEDURE — 99285 EMERGENCY DEPT VISIT HI MDM: CPT

## 2022-03-22 PROCEDURE — 93005 ELECTROCARDIOGRAM TRACING: CPT

## 2022-03-22 PROCEDURE — U0005 INFEC AGEN DETEC AMPLI PROBE: HCPCS

## 2022-03-22 PROCEDURE — 700105 HCHG RX REV CODE 258: Performed by: EMERGENCY MEDICINE

## 2022-03-22 PROCEDURE — 96374 THER/PROPH/DIAG INJ IV PUSH: CPT

## 2022-03-22 PROCEDURE — 80053 COMPREHEN METABOLIC PANEL: CPT

## 2022-03-22 PROCEDURE — 700111 HCHG RX REV CODE 636 W/ 250 OVERRIDE (IP): Performed by: EMERGENCY MEDICINE

## 2022-03-22 PROCEDURE — 84703 CHORIONIC GONADOTROPIN ASSAY: CPT

## 2022-03-22 PROCEDURE — 85025 COMPLETE CBC W/AUTO DIFF WBC: CPT

## 2022-03-22 PROCEDURE — 83690 ASSAY OF LIPASE: CPT

## 2022-03-22 PROCEDURE — 36415 COLL VENOUS BLD VENIPUNCTURE: CPT

## 2022-03-22 RX ORDER — SODIUM CHLORIDE, SODIUM LACTATE, POTASSIUM CHLORIDE, CALCIUM CHLORIDE 600; 310; 30; 20 MG/100ML; MG/100ML; MG/100ML; MG/100ML
1000 INJECTION, SOLUTION INTRAVENOUS ONCE
Status: COMPLETED | OUTPATIENT
Start: 2022-03-22 | End: 2022-03-22

## 2022-03-22 RX ORDER — KETOROLAC TROMETHAMINE 30 MG/ML
15 INJECTION, SOLUTION INTRAMUSCULAR; INTRAVENOUS ONCE
Status: COMPLETED | OUTPATIENT
Start: 2022-03-22 | End: 2022-03-22

## 2022-03-22 RX ORDER — NITROFURANTOIN 25; 75 MG/1; MG/1
100 CAPSULE ORAL 2 TIMES DAILY
Qty: 14 CAPSULE | Refills: 0 | Status: SHIPPED | OUTPATIENT
Start: 2022-03-22 | End: 2022-03-29

## 2022-03-22 RX ORDER — ONDANSETRON 4 MG/1
4 TABLET, ORALLY DISINTEGRATING ORAL EVERY 8 HOURS PRN
Qty: 10 TABLET | Refills: 0 | Status: SHIPPED | OUTPATIENT
Start: 2022-03-22 | End: 2022-06-08 | Stop reason: SDUPTHER

## 2022-03-22 RX ORDER — DICYCLOMINE HCL 20 MG
20 TABLET ORAL ONCE
Status: COMPLETED | OUTPATIENT
Start: 2022-03-22 | End: 2022-03-22

## 2022-03-22 RX ADMIN — SODIUM CHLORIDE, POTASSIUM CHLORIDE, SODIUM LACTATE AND CALCIUM CHLORIDE 1000 ML: 600; 310; 30; 20 INJECTION, SOLUTION INTRAVENOUS at 09:01

## 2022-03-22 RX ADMIN — DICYCLOMINE HYDROCHLORIDE 20 MG: 20 TABLET ORAL at 09:04

## 2022-03-22 RX ADMIN — KETOROLAC TROMETHAMINE 15 MG: 30 INJECTION, SOLUTION INTRAMUSCULAR at 09:00

## 2022-03-22 ASSESSMENT — FIBROSIS 4 INDEX: FIB4 SCORE: 0.4

## 2022-03-22 NOTE — ED TRIAGE NOTES
Complaints of n/v since yesterday, positive body aches, chills, no noted temp fevers.  Denies pregnancy.  No vomiting today but pain thru out that radiates to the back.

## 2022-03-22 NOTE — ED NOTES
Patient provided discharge instructions. Patient verbalized understanding. Patient leaving ER in stable condition. Patient ambulatory with steady gait. IV removed.

## 2022-03-22 NOTE — ED PROVIDER NOTES
"ED Provider Note    CHIEF COMPLAINT  Chief Complaint   Patient presents with   • Abdominal Pain   • N/V       HPI  Jenni Jensen is a 36 y.o. female who presents to the emergency part with nausea vomiting and diarrhea.  Patient started getting sick yesterday.  Too numerous to count episodes of nonbloody nonbilious emesis nonbloody watery diarrhea.  She has associated generalized abdominal cramping worse across her lower abdomen.  She has had tactile fever.  No documented temperature.  She has had chills and body aches.  Patient does report also congestion, runny nose and occasional cough.  No abnormal foods.  No known ill exposure.  No travel.    REVIEW OF SYSTEMS  As per HPI, otherwise a 10 point review of systems is negative    PAST MEDICAL HISTORY  Past Medical History:   Diagnosis Date   • Asthma        SOCIAL HISTORY  Social History     Tobacco Use   • Smoking status: Current Some Day Smoker     Years: 10.00     Types: Cigarettes   • Smokeless tobacco: Never Used   • Tobacco comment: about 2 cigarettes a week.    Vaping Use   • Vaping Use: Never used   Substance Use Topics   • Alcohol use: Yes     Comment: 1-2 beers 3 x per week   • Drug use: Yes     Comment: marijuana daily       SURGICAL HISTORY  Past Surgical History:   Procedure Laterality Date   • APPENDECTOMY     • TUBAL COAGULATION LAPAROSCOPIC BILATERAL     • VENTRAL HERNIA REPAIR         CURRENT MEDICATIONS  Home Medications     Reviewed by Deb Kim R.N. (Registered Nurse) on 03/22/22 at 0740  Med List Status: <None>   Medication Last Dose Status   albuterol 108 (90 Base) MCG/ACT Aero Soln inhalation aerosol  Active   cyclobenzaprine (FLEXERIL) 10 mg Tab  Active   naproxen (NAPROSYN) 500 MG Tab  Active                ALLERGIES  Allergies   Allergen Reactions   • Pollen Extract      \"Eyes water,can't breathe\"       PHYSICAL EXAM  VITAL SIGNS: /55   Pulse 73   Temp 35.9 °C (96.7 °F) (Temporal)   Resp 14   Ht 1.6 m (5' 3\")   Wt " 87.1 kg (192 lb)   SpO2 99%   BMI 34.01 kg/m²    Constitutional: Awake and alert  HENT: Mildly dry mucous membranes  Eyes: Normal inspection  Neck: Grossly normal range of motion.  Cardiovascular: Normal heart rate, Normal rhythm.  Symmetric peripheral pulses.   Thorax & Lungs: No respiratory distress, No wheezing, No rales, No rhonchi, No chest tenderness.   Abdomen: Bowel sounds normal, soft, non-distended, mild generalized tenderness  Skin: No obvious rash.  Extremities: No clubbing, cyanosis, edema, no Homans or cords.  Neurologic: Grossly normal   Psychiatric: Normal for situation        Labs:  Results for orders placed or performed during the hospital encounter of 03/22/22   CBC WITH DIFFERENTIAL   Result Value Ref Range    WBC 4.8 4.8 - 10.8 K/uL    RBC 4.69 4.20 - 5.40 M/uL    Hemoglobin 14.6 12.0 - 16.0 g/dL    Hematocrit 45.3 37.0 - 47.0 %    MCV 96.6 81.4 - 97.8 fL    MCH 31.1 27.0 - 33.0 pg    MCHC 32.2 (L) 33.6 - 35.0 g/dL    RDW 45.8 35.9 - 50.0 fL    Platelet Count 369 164 - 446 K/uL    MPV 9.4 9.0 - 12.9 fL    Neutrophils-Polys 56.30 44.00 - 72.00 %    Lymphocytes 25.40 22.00 - 41.00 %    Monocytes 13.10 0.00 - 13.40 %    Eosinophils 4.40 0.00 - 6.90 %    Basophils 0.40 0.00 - 1.80 %    Immature Granulocytes 0.40 0.00 - 0.90 %    Nucleated RBC 0.00 /100 WBC    Neutrophils (Absolute) 2.70 2.00 - 7.15 K/uL    Lymphs (Absolute) 1.22 1.00 - 4.80 K/uL    Monos (Absolute) 0.63 0.00 - 0.85 K/uL    Eos (Absolute) 0.21 0.00 - 0.51 K/uL    Baso (Absolute) 0.02 0.00 - 0.12 K/uL    Immature Granulocytes (abs) 0.02 0.00 - 0.11 K/uL    NRBC (Absolute) 0.00 K/uL   COMP METABOLIC PANEL   Result Value Ref Range    Sodium 137 135 - 145 mmol/L    Potassium 3.8 3.6 - 5.5 mmol/L    Chloride 105 96 - 112 mmol/L    Co2 23 20 - 33 mmol/L    Anion Gap 9.0 7.0 - 16.0    Glucose 117 (H) 65 - 99 mg/dL    Bun 13 8 - 22 mg/dL    Creatinine 0.63 0.50 - 1.40 mg/dL    Calcium 9.1 8.5 - 10.5 mg/dL    AST(SGOT) 17 12 - 45 U/L     ALT(SGPT) 11 2 - 50 U/L    Alkaline Phosphatase 52 30 - 99 U/L    Total Bilirubin 0.5 0.1 - 1.5 mg/dL    Albumin 4.1 3.2 - 4.9 g/dL    Total Protein 6.8 6.0 - 8.2 g/dL    Globulin 2.7 1.9 - 3.5 g/dL    A-G Ratio 1.5 g/dL   LIPASE   Result Value Ref Range    Lipase 11 11 - 82 U/L   URINALYSIS    Specimen: Urine   Result Value Ref Range    Color Yellow     Character Cloudy (A)     Specific Gravity 1.026 <1.035    Ph 6.0 5.0 - 8.0    Glucose Negative Negative mg/dL    Ketones Trace (A) Negative mg/dL    Protein 30 (A) Negative mg/dL    Bilirubin Negative Negative    Urobilinogen, Urine 1.0 Negative    Nitrite Negative Negative    Leukocyte Esterase Trace (A) Negative    Occult Blood Large (A) Negative    Micro Urine Req Microscopic    URINE MICROSCOPIC (W/UA)   Result Value Ref Range    WBC 20-50 (A) /hpf    RBC 5-10 (A) /hpf    Bacteria Moderate (A) None /hpf    Epithelial Cells Moderate (A) /hpf    Amorphous Crystal Present /hpf    Hyaline Cast 3-5 (A) /lpf   ESTIMATED GFR   Result Value Ref Range    GFR (CKD-EPI) 116 >60 mL/min/1.73 m 2   URINALYSIS    Specimen: Urine   Result Value Ref Range    Color DK Yellow     Character Cloudy (A)     Specific Gravity 1.031 <1.035    Ph 5.5 5.0 - 8.0    Glucose Negative Negative mg/dL    Ketones Trace (A) Negative mg/dL    Protein Negative Negative mg/dL    Bilirubin Negative Negative    Urobilinogen, Urine 1.0 Negative    Nitrite Negative Negative    Leukocyte Esterase Trace (A) Negative    Occult Blood Small (A) Negative    Micro Urine Req Microscopic    URINE MICROSCOPIC (W/UA)   Result Value Ref Range    WBC 5-10 (A) /hpf    RBC 0-2 /hpf    Bacteria Moderate (A) None /hpf    Epithelial Cells Few /hpf    Amorphous Crystal Present /hpf    Hyaline Cast 0-2 /lpf   SARS-CoV-2, PCR (24 Hour In-House) Collect NP swab in VTM    Specimen: Respirate   Result Value Ref Range    SARS-CoV-2 Source NP Swab    HCG QUAL SERUM   Result Value Ref Range    Beta-Hcg Qualitative Serum Negative  Negative   EKG (NOW)   Result Value Ref Range    Report       [facility]    Test Date:  2022  Pt Name:    KINJAL KIDD              Department: ER  MRN:        [id number]                      Room:  Gender:     Female                       Technician: [id number]  :        [id number]                   Requested By:ER TRIAGE PROTOCOL  Order #:    [id number]                    Reading MD: RACHAEL HOUSER MD    Measurements  Intervals                                Axis  Rate:       71                           P:          37  AK:         120                          QRS:        53  QRSD:       94                           T:          53  QT:         404  QTc:        439    Interpretive Statements  SINUS RHYTHM  Compared to ECG 2021 07:18:49  Sinus bradycardia no longer present  Electronically Signed On 3- 10:19:32 PDT by RACHAEL HOUSER MD         Medications   dicyclomine (BENTYL) tablet 20 mg (20 mg Oral Given 3/22/22 0904)   lactated ringers (LR) bolus (1,000 mL Intravenous New Bag 3/22/22 0901)   ketorolac (TORADOL) injection 15 mg (15 mg Intravenous Given 3/22/22 0900)       Measures:  HYDRATION: Based on the patient's presentation of Dehydration the patient was given IV fluids. IV Hydration was used because oral hydration was not adequate alone. Upon recheck following hydration, the patient was Stable.    COURSE & MEDICAL DECISION MAKING  Patient presents with vomiting and diarrhea.  She has associated abdominal pain.  Her vital signs are stable.  Appears mildly dehydrated.  Order IV fluid.  Order Bentyl for cramping.  Toradol anti-inflammatory.  EKG obtained in triage was unremarkable.    Laboratory data returned demonstrating no leukocytosis or left shift.  CMP is within normal limits aside from insignificant elevation of the glucose.  Lipase normal.  Initial UA was highly contaminated.  Obtained repeat specimen.  This is a clean specimen and there were 5-10  WBCs with bacteria on microscopy.  Possibly some mild associated cystitis.  She does not have flank pain or clinical suggestion of upper tract infection.  This will be treated with Macrobid.    At this point the patient is stable.  Vital signs are normal.  She is appropriate for outpatient management.  I will give prescription for Zofran to treat nausea.  Advised push fluids and bland diet.  We will send a Covid screen which is currently pending can be followed up on MyChart.  Patient to return to ER for worsening symptoms, not improving, dehydration or concern.    FINAL IMPRESSION  1.  Abdominal pain   2.  vomiting and diarrhea  3.  Cystitis  4.  Viral illness      This dictation was created using voice recognition software. The accuracy of the dictation is limited to the abilities of the software.  The nursing notes were reviewed and certain aspects of this information were incorporated into this note.      Electronically signed by: Brayden Mckeon M.D., 3/22/2022 10:15 AM

## 2022-03-22 NOTE — ED NOTES
Patient to room from lobby with steady gait. Changed into gown, connected to monitor. Urine sample obtained. PIV initiated. Agree with triage note. 7/10 LLQ and RLQ pain and lower back pain. Hx tubal ligation. Denies dysuria. No emesis or loose stool since yesterday.    Charted for ERP. Call light within reach.

## 2022-03-22 NOTE — ED NOTES
Urine sample obtained with mini cath and sent to lab. Dr. Mckeon aware. Call light within reach. Patient denies further needs.

## 2022-06-01 ENCOUNTER — OFFICE VISIT (OUTPATIENT)
Dept: MEDICAL GROUP | Facility: CLINIC | Age: 37
End: 2022-06-01
Payer: COMMERCIAL

## 2022-06-01 VITALS
OXYGEN SATURATION: 98 % | DIASTOLIC BLOOD PRESSURE: 70 MMHG | HEART RATE: 64 BPM | TEMPERATURE: 98 F | HEIGHT: 63 IN | SYSTOLIC BLOOD PRESSURE: 103 MMHG | BODY MASS INDEX: 36.14 KG/M2 | WEIGHT: 204 LBS | RESPIRATION RATE: 16 BRPM

## 2022-06-01 DIAGNOSIS — J45.20 MILD INTERMITTENT ASTHMA WITHOUT COMPLICATION: ICD-10-CM

## 2022-06-01 DIAGNOSIS — S06.0X0A CONCUSSION WITHOUT LOSS OF CONSCIOUSNESS, INITIAL ENCOUNTER: ICD-10-CM

## 2022-06-01 PROBLEM — M79.671 BILATERAL FOOT PAIN: Status: RESOLVED | Noted: 2020-10-20 | Resolved: 2022-06-01

## 2022-06-01 PROBLEM — M79.672 BILATERAL FOOT PAIN: Status: RESOLVED | Noted: 2020-10-20 | Resolved: 2022-06-01

## 2022-06-01 PROCEDURE — 99214 OFFICE O/P EST MOD 30 MIN: CPT | Mod: GC | Performed by: STUDENT IN AN ORGANIZED HEALTH CARE EDUCATION/TRAINING PROGRAM

## 2022-06-01 RX ORDER — ALBUTEROL SULFATE 90 UG/1
2 AEROSOL, METERED RESPIRATORY (INHALATION) EVERY 6 HOURS PRN
Qty: 8.5 G | Refills: 11 | Status: SHIPPED | OUTPATIENT
Start: 2022-06-01

## 2022-06-01 RX ORDER — DICLOFENAC POTASSIUM 50 MG/1
TABLET, FILM COATED ORAL
COMMUNITY
Start: 2022-05-30 | End: 2022-06-08 | Stop reason: SDUPTHER

## 2022-06-01 ASSESSMENT — FIBROSIS 4 INDEX: FIB4 SCORE: 0.53

## 2022-06-01 ASSESSMENT — PATIENT HEALTH QUESTIONNAIRE - PHQ9: CLINICAL INTERPRETATION OF PHQ2 SCORE: 0

## 2022-06-01 NOTE — ASSESSMENT & PLAN NOTE
Scat 5 filled out with patient and will be scanned into media tab.  Patient will follow up with Dr. Cardenas in 2 weeks.  Excuse patient from work until 6/5/2022.  Work note was provided to patient.  ED return precautions discussed with patient.  Postconcussive patient instruction pamphlet provided.

## 2022-06-01 NOTE — PROGRESS NOTES
"Subjective:     CC: fall    HPI:   Jenni presents today with :    Problem   Concussion Without Loss of Consciousness    On Friday 5/27/2022- slip and fall on oil spill in [place]. Tried to catch herself on her hands.  She does not believe she lost consciousness or hit head. Didn't think she needed ED at the time but 1 hour later she began to nausea, vomiting and headache. She was admitted overnight. She was diagnosed with a concussion and discharged on Saturday.  Since discharge she has had some nausea, dizziness and headache.  She has been taking Zofran for the nausea which has been helping.  This is also been associated with generalized aches and pains throughout her body.  Particularly worse in her hip.     Bilateral Foot Pain (Resolved)       Current Outpatient Medications Ordered in Epic   Medication Sig Dispense Refill   • diclofenac (CATAFLAM) 50 MG tablet      • albuterol 108 (90 Base) MCG/ACT Aero Soln inhalation aerosol Inhale 2 Puffs every 6 hours as needed for Shortness of Breath. 8.5 g 11   • ondansetron (ZOFRAN ODT) 4 MG TABLET DISPERSIBLE Take 1 Tablet by mouth every 8 hours as needed. 10 Tablet 0     No current Epic-ordered facility-administered medications on file.         ROS:  Gen: no fevers/chills, no changes in weight  Pulm: no sob, no cough  CV: no chest pain, no palpitations  GI: no nausea/vomiting, no diarrhea    Objective:     Exam:  /70   Pulse 64   Temp 36.7 °C (98 °F) (Temporal)   Resp 16   Ht 1.6 m (5' 3\")   Wt 92.5 kg (204 lb)   SpO2 98%   BMI 36.14 kg/m²  Body mass index is 36.14 kg/m².    Gen: Alert and oriented, No apparent distress.    NEURO  MENTAL STATUS: AAOx2, fund of knowledge appropriate  LANG/SPEECH: Naming and repetition intact, fluent, follows 3-step commands  CRANIAL NERVES:  II: Pupils equal and reactive, no RAPD, no VF deficits, normal fundus  III, IV, VI: EOM intact, no gaze preference or deviation, no nystagmus.  V: normal sensation in V1, V2, and V3 " segments bilaterally  VII: no asymmetry, no nasolabial fold flattening  VIII: normal hearing to speech  IX, X: normal palatal elevation, no uvular deviation  XI: 5/5 head turn and 5/5 shoulder shrug bilaterally  XII: midline tongue protrusion  MOTOR: 5/5 muscle power bilateral upper and lower ext   SENSORY: Normal to touch, No hemineglect, no extinction to double sided stimulation (visual & tactile)  COORD: Normal finger to nose. no tremor  Ext: No clubbing, cyanosis, edema.    Labs: reviewed     Assessment & Plan:     36 y.o. female with the following -     Problem List Items Addressed This Visit     Mild intermittent asthma without complication    Relevant Medications    albuterol 108 (90 Base) MCG/ACT Aero Soln inhalation aerosol    Concussion without loss of consciousness     Scat 5 filled out with patient and will be scanned into media tab.  Patient will follow up with Dr. Cardenas in 2 weeks.  Excuse patient from work until 6/5/2022.  Work note was provided to patient.  ED return precautions discussed with patient.  Postconcussive patient instruction pamphlet provided.                   No follow-ups on file.    Meg Good MD   PGY2

## 2022-06-01 NOTE — LETTER
2022    To be concerned:    Jenni Jensen ( 1985) was seen in my office today for medical concerns.  Please excuse her from work 2022 through 2022.  She has a medical condition that prevents her from attending to her day-to-day activities.  She is cleared to return to work on 2022.    These do not hesitate to reach out to our office to have further questions or concerns.        Meg Good MD

## 2022-06-01 NOTE — NON-PROVIDER
Slip and fall on oil spill in [place]. Tried to catch herself on her hands.  She does not believe she lost consciousness or hit head. Didn't think she needed ED at the time but 1 hour later she began to nausea, vomiting and headache. She was admitted overnight. She was diagnosed with a concussion and discharged on Saturday.     She continues to have pain in her back, shoulder, hips, and legs. She has a sharp radiating pain from the hip down her knee.     She wakes up with headache with nausea. She is taking diclofenac and zofran. She is having soreness between shoulder blades into neck. She has had no vomiting. She has some periods of altered mentation and confusion. Some changes to vision and photophobia. Some phonophobia. She has dizziness and unsteadiness. Some more forgetfulness but maybe not more than usual.     PMH  Asthma -albuterol (has used it more)    Medications:  None    Smokes marijuana daily with no use since fall  No alcohol since fall, about 1 beer per day otherwise   No tobacco use

## 2022-06-08 ENCOUNTER — OFFICE VISIT (OUTPATIENT)
Dept: MEDICAL GROUP | Facility: CLINIC | Age: 37
End: 2022-06-08
Payer: COMMERCIAL

## 2022-06-08 VITALS
WEIGHT: 202 LBS | HEART RATE: 66 BPM | RESPIRATION RATE: 16 BRPM | DIASTOLIC BLOOD PRESSURE: 72 MMHG | TEMPERATURE: 98.1 F | HEIGHT: 63 IN | BODY MASS INDEX: 35.79 KG/M2 | SYSTOLIC BLOOD PRESSURE: 107 MMHG | OXYGEN SATURATION: 96 %

## 2022-06-08 DIAGNOSIS — S06.0X0D CONCUSSION WITHOUT LOSS OF CONSCIOUSNESS, SUBSEQUENT ENCOUNTER: ICD-10-CM

## 2022-06-08 DIAGNOSIS — R11.2 NAUSEA AND VOMITING, INTRACTABILITY OF VOMITING NOT SPECIFIED, UNSPECIFIED VOMITING TYPE: ICD-10-CM

## 2022-06-08 PROCEDURE — 99213 OFFICE O/P EST LOW 20 MIN: CPT | Performed by: FAMILY MEDICINE

## 2022-06-08 RX ORDER — DICLOFENAC POTASSIUM 50 MG/1
50 TABLET, FILM COATED ORAL 2 TIMES DAILY PRN
Qty: 20 TABLET | Refills: 0 | Status: SHIPPED | OUTPATIENT
Start: 2022-06-08 | End: 2022-06-18

## 2022-06-08 RX ORDER — ONDANSETRON 4 MG/1
4 TABLET, ORALLY DISINTEGRATING ORAL EVERY 8 HOURS PRN
Qty: 10 TABLET | Refills: 0 | Status: SHIPPED | OUTPATIENT
Start: 2022-06-08 | End: 2022-06-15

## 2022-06-08 ASSESSMENT — FIBROSIS 4 INDEX: FIB4 SCORE: 0.53

## 2022-06-08 NOTE — PROGRESS NOTES
Subjective:   CC:   Chief Complaint   Patient presents with   • Referral Needed     PT referral for concussion.        HPI: Jenni is a 36 y.o. female who presents today for the following problems:    Problem   Concussion Without Loss of Consciousness    Patient in my clinic today from referral from the residents [facility] patient on th 27th May slipped and fell in [place] and sustained a concussion.  She saw ER help status post the accident and was discharged later that evening.  She is about 10 days into her concussion and is still having symptoms of fogginess and inability to concentrate and headaches.  She completed a scat 5 symptom checklist please see attached in media.  She states that she is now starting to sleep better at first she was actually nauseous during the day whenever she had sudden movements or had to change positions.  For this she has been using Zofran which has helped her and she is asking for refill.  In addition she has been taking diclofenac for pain.  She states that she is still has headaches and neck pain from the accident no other issues at this time and states that overall her health is pretty good.         Current Outpatient Medications   Medication Sig Dispense Refill   • diclofenac (CATAFLAM) 50 MG tablet Take 1 Tablet by mouth 2 times a day as needed (pain) for up to 10 days. 20 Tablet 0   • ondansetron (ZOFRAN ODT) 4 MG TABLET DISPERSIBLE Take 1 Tablet by mouth every 8 hours as needed for Nausea for up to 7 days. 10 Tablet 0   • albuterol 108 (90 Base) MCG/ACT Aero Soln inhalation aerosol Inhale 2 Puffs every 6 hours as needed for Shortness of Breath. 8.5 g 11     No current facility-administered medications for this visit.       Social History     Tobacco Use   • Smoking status: Current Some Day Smoker     Years: 10.00     Types: Cigarettes   • Smokeless tobacco: Never Used   • Tobacco comment: about 2 cigarettes a week.    Vaping Use   • Vaping Use: Never used   Substance Use  "Topics   • Alcohol use: Yes     Comment: 1-2 beers 3 x per week   • Drug use: Yes     Comment: marijuana daily       Review of Systems   Constitutional: Negative.    HENT: Negative.    Eyes: Negative.    Respiratory: Negative.    Cardiovascular: Negative.    Gastrointestinal: Negative.    Skin: Negative.    Neurological: Negative.    Psychiatric/Behavioral: Negative.          Objective:     Vitals:    06/08/22 0950   BP: 107/72   BP Location: Left arm   Patient Position: Sitting   BP Cuff Size: Adult   Pulse: 66   Resp: 16   Temp: 36.7 °C (98.1 °F)   TempSrc: Temporal   SpO2: 96%   Weight: 91.6 kg (202 lb)   Height: 1.6 m (5' 3\")     Body mass index is 35.78 kg/m².     Physical Exam  Vitals reviewed.   Constitutional:       Appearance: Normal appearance.   HENT:      Head: Normocephalic.   Cardiovascular:      Rate and Rhythm: Normal rate and regular rhythm.   Pulmonary:      Effort: Pulmonary effort is normal.      Breath sounds: Normal breath sounds.   Neurological:      Mental Status: She is alert and oriented to person, place, and time. Mental status is at baseline.      Comments: + number recall reversed at 4   + months of the year backwards   Positive Romberg and a little bit of difficulty with lateral tracking.   Psychiatric:         Mood and Affect: Mood normal.         Behavior: Behavior normal.         Thought Content: Thought content normal.          Assessment & Plan:   Concussion without loss of consciousness  Feel that this patient is suffering from an acute concussion.  Discussed her sleep hygiene and the importance of sleep while she is concussed.  In addition I will go ahead and recommend some physical therapy for her so that we can work on her acceleration the acceleration soreness going to refill her ondansetron and diclofenac though I feel that she probably will not need them as much as she did before hand.  We will follow her up in 2 weeks.      Followup: Return in about 2 weeks (around " 6/22/2022), or if symptoms worsen or fail to improve.    Marty Cardenas M.D.    Please note that this dictation was created using voice recognition software. I have made every reasonable attempt to correct obvious errors, but I expect that there are errors of grammar and possibly content that I did not discover before finalizing the note.

## 2022-06-14 ASSESSMENT — ENCOUNTER SYMPTOMS
RESPIRATORY NEGATIVE: 1
EYES NEGATIVE: 1
CARDIOVASCULAR NEGATIVE: 1
GASTROINTESTINAL NEGATIVE: 1
PSYCHIATRIC NEGATIVE: 1
NEUROLOGICAL NEGATIVE: 1
CONSTITUTIONAL NEGATIVE: 1

## 2022-06-14 NOTE — ASSESSMENT & PLAN NOTE
Feel that this patient is suffering from an acute concussion.  Discussed her sleep hygiene and the importance of sleep while she is concussed.  In addition I will go ahead and recommend some physical therapy for her so that we can work on her acceleration the acceleration soreness going to refill her ondansetron and diclofenac though I feel that she probably will not need them as much as she did before hand.  We will follow her up in 2 weeks.

## 2022-06-22 ENCOUNTER — APPOINTMENT (OUTPATIENT)
Dept: MEDICAL GROUP | Facility: OTHER | Age: 37
End: 2022-06-22
Payer: COMMERCIAL

## 2022-06-24 ENCOUNTER — OFFICE VISIT (OUTPATIENT)
Dept: MEDICAL GROUP | Facility: OTHER | Age: 37
End: 2022-06-24
Payer: COMMERCIAL

## 2022-06-24 VITALS
HEIGHT: 63 IN | DIASTOLIC BLOOD PRESSURE: 62 MMHG | TEMPERATURE: 97.8 F | HEART RATE: 56 BPM | SYSTOLIC BLOOD PRESSURE: 102 MMHG | BODY MASS INDEX: 36.86 KG/M2 | WEIGHT: 208 LBS | OXYGEN SATURATION: 97 %

## 2022-06-24 DIAGNOSIS — F07.81 POST CONCUSSION SYNDROME: ICD-10-CM

## 2022-06-24 PROCEDURE — 99213 OFFICE O/P EST LOW 20 MIN: CPT | Performed by: FAMILY MEDICINE

## 2022-06-24 ASSESSMENT — FIBROSIS 4 INDEX: FIB4 SCORE: 0.53

## 2022-06-24 NOTE — PROGRESS NOTES
"Subjective:   CC:   Chief Complaint   Patient presents with   • Follow-Up     Follow up concussion       HPI: Jenni is a 36 y.o. female who presents today for the following problems:    Problem   Post Concussion Syndrome    Pt sustained a concussion s/p fall in [place] at the end of May and is still having emotional liability and headache - inability to concentrate - all fairly significantly - shes 2 months into this and not getting better - please see the scat questionnaire this visit   Pt feeling fristrated - but when asked she does state that she has made some improvements and is 20 % better than last visit 2 weeks ago          Current Outpatient Medications   Medication Sig Dispense Refill   • albuterol 108 (90 Base) MCG/ACT Aero Soln inhalation aerosol Inhale 2 Puffs every 6 hours as needed for Shortness of Breath. 8.5 g 11     No current facility-administered medications for this visit.       Social History     Tobacco Use   • Smoking status: Current Some Day Smoker     Years: 10.00     Types: Cigarettes   • Smokeless tobacco: Never Used   • Tobacco comment: about 2 cigarettes a week.    Vaping Use   • Vaping Use: Never used   Substance Use Topics   • Alcohol use: Yes     Comment: 1-2 beers 3 x per week   • Drug use: Yes     Comment: marijuana daily       Review of Systems   Constitutional: Negative.    HENT: Negative.    Eyes: Negative.    Respiratory: Negative.    Cardiovascular: Negative.    Gastrointestinal: Negative.    Skin: Negative.    Neurological: Negative.    Psychiatric/Behavioral: Negative.          Objective:     Vitals:    06/24/22 0906   BP: 102/62   BP Location: Left arm   Patient Position: Sitting   Pulse: (!) 56   Temp: 36.6 °C (97.8 °F)   SpO2: 97%   Weight: 94.3 kg (208 lb)   Height: 1.6 m (5' 3\")     Body mass index is 36.85 kg/m².     Physical Exam  Vitals reviewed.   Constitutional:       Appearance: Normal appearance.   HENT:      Head: Normocephalic and atraumatic. "   Cardiovascular:      Rate and Rhythm: Normal rate and regular rhythm.      Pulses: Normal pulses.      Heart sounds: Normal heart sounds.   Musculoskeletal:      Cervical back: Normal range of motion.   Neurological:      Mental Status: She is alert and oriented to person, place, and time. Mental status is at baseline.      Cranial Nerves: No cranial nerve deficit.      Sensory: No sensory deficit.      Motor: No weakness.      Coordination: Coordination normal.      Comments: Pt very distraught today - able to clearly state that she is frustrated and scared .... horizontal gaze was negative for nystagmus   No photophobia noted   Perrla   rhomberg was still +      Psychiatric:         Mood and Affect: Mood normal.          Assessment & Plan:   Post concussion syndrome  At this point she is still very symptomatic - Im going to send her to get an mri - if that is clear I will ask the [facility] neuromechanics lab to help with evaluation of her areas of weakness so we can address areas of need first. She has the referral for PT is about to start       Followup: Return in about 2 weeks (around 7/8/2022), or if symptoms worsen or fail to improve.    Marty Cardenas M.D.    Please note that this dictation was created using voice recognition software. I have made every reasonable attempt to correct obvious errors, but I expect that there are errors of grammar and possibly content that I did not discover before finalizing the note.

## 2022-06-29 PROBLEM — F07.81 POST CONCUSSION SYNDROME: Status: ACTIVE | Noted: 2022-06-29

## 2022-06-29 ASSESSMENT — ENCOUNTER SYMPTOMS
RESPIRATORY NEGATIVE: 1
GASTROINTESTINAL NEGATIVE: 1
NEUROLOGICAL NEGATIVE: 1
PSYCHIATRIC NEGATIVE: 1
EYES NEGATIVE: 1
CARDIOVASCULAR NEGATIVE: 1
CONSTITUTIONAL NEGATIVE: 1

## 2022-06-29 NOTE — ASSESSMENT & PLAN NOTE
At this point she is still very symptomatic - Im going to send her to get an mri - if that is clear I will ask the [facility] neuromechanics lab to help with evaluation of her areas of weakness so we can address areas of need first. She has the referral for PT is about to start

## 2022-07-14 ENCOUNTER — HOSPITAL ENCOUNTER (OUTPATIENT)
Dept: RADIOLOGY | Facility: MEDICAL CENTER | Age: 37
End: 2022-07-14
Attending: FAMILY MEDICINE
Payer: COMMERCIAL

## 2022-07-22 ENCOUNTER — OFFICE VISIT (OUTPATIENT)
Dept: MEDICAL GROUP | Facility: OTHER | Age: 37
End: 2022-07-22
Payer: COMMERCIAL

## 2022-07-22 VITALS
RESPIRATION RATE: 16 BRPM | DIASTOLIC BLOOD PRESSURE: 68 MMHG | TEMPERATURE: 96.9 F | HEIGHT: 63 IN | WEIGHT: 213 LBS | SYSTOLIC BLOOD PRESSURE: 113 MMHG | HEART RATE: 59 BPM | BODY MASS INDEX: 37.74 KG/M2 | OXYGEN SATURATION: 96 %

## 2022-07-22 DIAGNOSIS — M54.50 BILATERAL LOW BACK PAIN WITHOUT SCIATICA, UNSPECIFIED CHRONICITY: ICD-10-CM

## 2022-07-22 DIAGNOSIS — S06.0X0D CONCUSSION WITHOUT LOSS OF CONSCIOUSNESS, SUBSEQUENT ENCOUNTER: ICD-10-CM

## 2022-07-22 DIAGNOSIS — F07.81 POST CONCUSSION SYNDROME: ICD-10-CM

## 2022-07-22 PROCEDURE — 99213 OFFICE O/P EST LOW 20 MIN: CPT | Performed by: FAMILY MEDICINE

## 2022-07-22 RX ORDER — MELOXICAM 15 MG/1
15 TABLET ORAL DAILY
Qty: 21 TABLET | Refills: 0 | Status: SHIPPED | OUTPATIENT
Start: 2022-07-22 | End: 2022-08-12

## 2022-07-22 ASSESSMENT — FIBROSIS 4 INDEX: FIB4 SCORE: 0.53

## 2022-07-22 NOTE — PROGRESS NOTES
Subjective:   CC:   Chief Complaint   Patient presents with   • Follow-Up     Was referred out for physical therapy and hasn't been able to do it. Needs med refilled for nausea and headaches        HPI: Jenni is a 36 y.o. female who presents today for the following problems:    Problem   Post Concussion Syndrome    Pt sustained a concussion s/p fall in [place] at the end of May. She is becoming frustrated because she is not improving as fast as she would like   She states that she is still having some significant symptoms (see symptom checklist date if visit ) she is however better than she was - she states taht she vary between 50-60% normal and she was below 50% when she started -   Denies new issues   No new trauma   Getting into pt now          Current Outpatient Medications   Medication Sig Dispense Refill   • meloxicam (MOBIC) 15 MG tablet Take 1 Tablet by mouth every day for 21 days. 21 Tablet 0   • albuterol 108 (90 Base) MCG/ACT Aero Soln inhalation aerosol Inhale 2 Puffs every 6 hours as needed for Shortness of Breath. 8.5 g 11     No current facility-administered medications for this visit.       Social History     Tobacco Use   • Smoking status: Current Some Day Smoker     Years: 10.00     Types: Cigarettes   • Smokeless tobacco: Never Used   • Tobacco comment: about 2 cigarettes a week.    Vaping Use   • Vaping Use: Never used   Substance Use Topics   • Alcohol use: Yes     Comment: 1-2 beers 3 x per week   • Drug use: Yes     Comment: marijuana daily       Review of Systems   Constitutional: Negative.    HENT: Negative.    Eyes: Negative.    Respiratory: Negative.    Cardiovascular: Negative.    Gastrointestinal: Negative.    Skin: Negative.    Neurological: Negative.    Psychiatric/Behavioral: Negative.          Objective:     Vitals:    07/22/22 0847   BP: 113/68   BP Location: Right arm   Patient Position: Sitting   BP Cuff Size: Adult long   Pulse: (!) 59   Resp: 16   Temp: 36.1 °C (96.9 °F)  "  TempSrc: Temporal   SpO2: 96%   Weight: 96.6 kg (213 lb)   Height: 1.6 m (5' 3\")     Body mass index is 37.73 kg/m².     Physical Exam  Vitals reviewed.   Constitutional:       Appearance: Normal appearance.   HENT:      Head: Normocephalic and atraumatic.   Cardiovascular:      Rate and Rhythm: Normal rate and regular rhythm.      Pulses: Normal pulses.      Heart sounds: Normal heart sounds.   Pulmonary:      Effort: Pulmonary effort is normal.      Breath sounds: Normal breath sounds.   Musculoskeletal:         General: Normal range of motion.   Neurological:      General: No focal deficit present.      Mental Status: She is alert and oriented to person, place, and time.      Comments: rhomberg is negative   serial numbers reversed are up to 6   Tandem gait is negative             Assessment & Plan:   Post concussion syndrome  im going to get her to cont into PT - she has just started - mri was negative of head   Fu with me in 3 weeks ]  Pt reassured this is part of healing       Followup: Return in about 3 weeks (around 8/12/2022), or if symptoms worsen or fail to improve.    Marty Cardenas M.D.    Please note that this dictation was created using voice recognition software. I have made every reasonable attempt to correct obvious errors, but I expect that there are errors of grammar and possibly content that I did not discover before finalizing the note.  "

## 2022-07-29 ENCOUNTER — HOSPITAL ENCOUNTER (OUTPATIENT)
Dept: RADIOLOGY | Facility: MEDICAL CENTER | Age: 37
End: 2022-07-29
Attending: FAMILY MEDICINE
Payer: COMMERCIAL

## 2022-07-29 DIAGNOSIS — F07.81 POST CONCUSSION SYNDROME: ICD-10-CM

## 2022-07-29 PROCEDURE — 70553 MRI BRAIN STEM W/O & W/DYE: CPT

## 2022-07-29 PROCEDURE — 700117 HCHG RX CONTRAST REV CODE 255: Performed by: FAMILY MEDICINE

## 2022-07-29 PROCEDURE — A9576 INJ PROHANCE MULTIPACK: HCPCS | Performed by: FAMILY MEDICINE

## 2022-07-29 RX ADMIN — GADOTERIDOL 20 ML: 279.3 INJECTION, SOLUTION INTRAVENOUS at 16:25

## 2022-08-01 ENCOUNTER — APPOINTMENT (OUTPATIENT)
Dept: MEDICAL GROUP | Facility: OTHER | Age: 37
End: 2022-08-01
Payer: COMMERCIAL

## 2022-08-03 ASSESSMENT — ENCOUNTER SYMPTOMS
GASTROINTESTINAL NEGATIVE: 1
EYES NEGATIVE: 1
CONSTITUTIONAL NEGATIVE: 1
CARDIOVASCULAR NEGATIVE: 1
NEUROLOGICAL NEGATIVE: 1
RESPIRATORY NEGATIVE: 1
PSYCHIATRIC NEGATIVE: 1

## 2022-08-03 NOTE — ASSESSMENT & PLAN NOTE
im going to get her to cont into PT - she has just started - mri was negative of head   Fu with me in 3 weeks ]  Pt reassured this is part of healing     As she starts PT assume she will be sore - will write for meloxicam

## 2022-08-25 ENCOUNTER — APPOINTMENT (OUTPATIENT)
Dept: MEDICAL GROUP | Facility: OTHER | Age: 37
End: 2022-08-25
Payer: COMMERCIAL

## 2022-09-20 ENCOUNTER — TELEPHONE (OUTPATIENT)
Dept: MEDICAL GROUP | Facility: CLINIC | Age: 37
End: 2022-09-20
Payer: COMMERCIAL

## 2022-09-20 NOTE — TELEPHONE ENCOUNTER
Patient called stating that she is requesting a new PT Order to go to [facility] which she has been doing good per Medicaid she needs a new one.

## 2022-09-21 ENCOUNTER — TELEPHONE (OUTPATIENT)
Dept: MEDICAL GROUP | Facility: OTHER | Age: 37
End: 2022-09-21
Payer: COMMERCIAL

## 2022-09-21 DIAGNOSIS — M54.50 BILATERAL LOW BACK PAIN WITHOUT SCIATICA, UNSPECIFIED CHRONICITY: ICD-10-CM

## 2022-11-07 ENCOUNTER — APPOINTMENT (OUTPATIENT)
Dept: RADIOLOGY | Facility: MEDICAL CENTER | Age: 37
End: 2022-11-07
Attending: EMERGENCY MEDICINE
Payer: COMMERCIAL

## 2022-11-07 ENCOUNTER — HOSPITAL ENCOUNTER (EMERGENCY)
Facility: MEDICAL CENTER | Age: 37
End: 2022-11-07
Attending: EMERGENCY MEDICINE
Payer: COMMERCIAL

## 2022-11-07 VITALS
WEIGHT: 228.4 LBS | SYSTOLIC BLOOD PRESSURE: 105 MMHG | HEART RATE: 75 BPM | DIASTOLIC BLOOD PRESSURE: 50 MMHG | BODY MASS INDEX: 40.47 KG/M2 | RESPIRATION RATE: 14 BRPM | OXYGEN SATURATION: 96 % | HEIGHT: 63 IN | TEMPERATURE: 98.4 F

## 2022-11-07 DIAGNOSIS — R07.81 PLEURITIC CHEST PAIN: ICD-10-CM

## 2022-11-07 DIAGNOSIS — J45.21 MILD INTERMITTENT ASTHMA WITH ACUTE EXACERBATION: ICD-10-CM

## 2022-11-07 LAB
ALBUMIN SERPL BCP-MCNC: 3.7 G/DL (ref 3.2–4.9)
ALBUMIN/GLOB SERPL: 1.5 G/DL
ALP SERPL-CCNC: 70 U/L (ref 30–99)
ALT SERPL-CCNC: 15 U/L (ref 2–50)
ANION GAP SERPL CALC-SCNC: 13 MMOL/L (ref 7–16)
AST SERPL-CCNC: 13 U/L (ref 12–45)
BASOPHILS # BLD AUTO: 0.6 % (ref 0–1.8)
BASOPHILS # BLD: 0.05 K/UL (ref 0–0.12)
BILIRUB SERPL-MCNC: 0.2 MG/DL (ref 0.1–1.5)
BUN SERPL-MCNC: 8 MG/DL (ref 8–22)
CALCIUM SERPL-MCNC: 8.6 MG/DL (ref 8.4–10.2)
CHLORIDE SERPL-SCNC: 109 MMOL/L (ref 96–112)
CO2 SERPL-SCNC: 17 MMOL/L (ref 20–33)
CREAT SERPL-MCNC: 0.63 MG/DL (ref 0.5–1.4)
D DIMER PPP IA.FEU-MCNC: 0.4 UG/ML (FEU) (ref 0–0.5)
EKG IMPRESSION: NORMAL
EOSINOPHIL # BLD AUTO: 0.92 K/UL (ref 0–0.51)
EOSINOPHIL NFR BLD: 10.2 % (ref 0–6.9)
ERYTHROCYTE [DISTWIDTH] IN BLOOD BY AUTOMATED COUNT: 45.5 FL (ref 35.9–50)
FLUAV RNA SPEC QL NAA+PROBE: NEGATIVE
FLUBV RNA SPEC QL NAA+PROBE: NEGATIVE
GFR SERPLBLD CREATININE-BSD FMLA CKD-EPI: 116 ML/MIN/1.73 M 2
GLOBULIN SER CALC-MCNC: 2.5 G/DL (ref 1.9–3.5)
GLUCOSE SERPL-MCNC: 125 MG/DL (ref 65–99)
HCG SERPL QL: NEGATIVE
HCT VFR BLD AUTO: 42 % (ref 37–47)
HGB BLD-MCNC: 14.1 G/DL (ref 12–16)
IMM GRANULOCYTES # BLD AUTO: 0.08 K/UL (ref 0–0.11)
IMM GRANULOCYTES NFR BLD AUTO: 0.9 % (ref 0–0.9)
LYMPHOCYTES # BLD AUTO: 2.03 K/UL (ref 1–4.8)
LYMPHOCYTES NFR BLD: 22.4 % (ref 22–41)
MCH RBC QN AUTO: 31.8 PG (ref 27–33)
MCHC RBC AUTO-ENTMCNC: 33.6 G/DL (ref 33.6–35)
MCV RBC AUTO: 94.8 FL (ref 81.4–97.8)
MONOCYTES # BLD AUTO: 0.78 K/UL (ref 0–0.85)
MONOCYTES NFR BLD AUTO: 8.6 % (ref 0–13.4)
NEUTROPHILS # BLD AUTO: 5.19 K/UL (ref 2–7.15)
NEUTROPHILS NFR BLD: 57.3 % (ref 44–72)
NRBC # BLD AUTO: 0 K/UL
NRBC BLD-RTO: 0 /100 WBC
PLATELET # BLD AUTO: 393 K/UL (ref 164–446)
PMV BLD AUTO: 9.3 FL (ref 9–12.9)
POTASSIUM SERPL-SCNC: 3.6 MMOL/L (ref 3.6–5.5)
PROT SERPL-MCNC: 6.2 G/DL (ref 6–8.2)
RBC # BLD AUTO: 4.43 M/UL (ref 4.2–5.4)
RSV RNA SPEC QL NAA+PROBE: NEGATIVE
SARS-COV-2 RNA RESP QL NAA+PROBE: NOTDETECTED
SODIUM SERPL-SCNC: 139 MMOL/L (ref 135–145)
SPECIMEN SOURCE: NORMAL
TROPONIN T SERPL-MCNC: <6 NG/L (ref 6–19)
WBC # BLD AUTO: 9.1 K/UL (ref 4.8–10.8)

## 2022-11-07 PROCEDURE — 94760 N-INVAS EAR/PLS OXIMETRY 1: CPT

## 2022-11-07 PROCEDURE — 80053 COMPREHEN METABOLIC PANEL: CPT

## 2022-11-07 PROCEDURE — 700101 HCHG RX REV CODE 250: Performed by: EMERGENCY MEDICINE

## 2022-11-07 PROCEDURE — 36415 COLL VENOUS BLD VENIPUNCTURE: CPT

## 2022-11-07 PROCEDURE — C9803 HOPD COVID-19 SPEC COLLECT: HCPCS | Performed by: EMERGENCY MEDICINE

## 2022-11-07 PROCEDURE — 94640 AIRWAY INHALATION TREATMENT: CPT

## 2022-11-07 PROCEDURE — 84703 CHORIONIC GONADOTROPIN ASSAY: CPT

## 2022-11-07 PROCEDURE — 93005 ELECTROCARDIOGRAM TRACING: CPT | Performed by: EMERGENCY MEDICINE

## 2022-11-07 PROCEDURE — 71045 X-RAY EXAM CHEST 1 VIEW: CPT

## 2022-11-07 PROCEDURE — 84484 ASSAY OF TROPONIN QUANT: CPT

## 2022-11-07 PROCEDURE — 0241U HCHG SARS-COV-2 COVID-19 NFCT DS RESP RNA 4 TRGT MIC: CPT

## 2022-11-07 PROCEDURE — 85379 FIBRIN DEGRADATION QUANT: CPT

## 2022-11-07 PROCEDURE — 99283 EMERGENCY DEPT VISIT LOW MDM: CPT

## 2022-11-07 PROCEDURE — 85025 COMPLETE CBC W/AUTO DIFF WBC: CPT

## 2022-11-07 RX ORDER — ALBUTEROL SULFATE 90 UG/1
2 AEROSOL, METERED RESPIRATORY (INHALATION) EVERY 4 HOURS PRN
Qty: 8 G | Refills: 0 | Status: SHIPPED | OUTPATIENT
Start: 2022-11-07

## 2022-11-07 RX ORDER — PREDNISONE 20 MG/1
60 TABLET ORAL DAILY
Qty: 15 TABLET | Refills: 0 | Status: SHIPPED | OUTPATIENT
Start: 2022-11-07 | End: 2022-11-12

## 2022-11-07 RX ADMIN — ALBUTEROL SULFATE 5 MG: 2.5 SOLUTION RESPIRATORY (INHALATION) at 21:27

## 2022-11-07 ASSESSMENT — LIFESTYLE VARIABLES: DO YOU DRINK ALCOHOL: NO

## 2022-11-07 ASSESSMENT — FIBROSIS 4 INDEX: FIB4 SCORE: 0.54

## 2022-11-08 NOTE — ED TRIAGE NOTES
Chief Complaint   Patient presents with    Shortness of Breath     d    Difficulty Breathing    Chest Pain     Shortness of breath x1 week, worsened today, intermittent chest pain.  Denies fever, cough, n/v//dizziness.  Using albuterol inhaler more than 3 times today w/o relief.

## 2022-11-08 NOTE — DISCHARGE INSTRUCTIONS
Return to the emergency department if you have new or different chest pain, cough up blood, increasing shortness of breath, productive cough or fever that will not go down with Tylenol or ibuprofen.

## 2022-11-08 NOTE — ED PROVIDER NOTES
ED Provider Note    Scribed for Jose Wolff M.D. by Wendi Whaley. 11/7/2022, 8:53 PM.    Primary care provider: Stephy Hinkle P.A.-C.  Means of arrival: Walk in   History obtained from: Patient  History limited by: None    CHIEF COMPLAINT  Chief Complaint   Patient presents with    Shortness of Breath     d    Difficulty Breathing    Chest Pain     Shortness of breath x1 week, worsened today, intermittent chest pain.  Denies fever, cough, n/v//dizziness.  Using albuterol inhaler more than 3 times today w/o relief.        HPI  Jenni Jensen is a 37 y.o. female with asthma who presents to the Emergency Department for evaluation of sharp right sided chest pain onset few weeks. Patient reports that the chest pain is intermittent, and it is worse at night. Additionally, patient has been feeling very short of breath. She feels as if she cannot take a full breath. The shortness of breath will wake Jenni up in the middle of the night. She has been using her inhaler very often. She admits to associated symptoms of productive cough, tactile fever, chills, bloating, tingling in arms, but denies swelling or pain in one leg. No alleviating factors were reported. Patient smokes marijuana and drinks a few beers daily. No history of blood clot in legs or lungs. Patient has not recently traveled. She is not vaccinated for COVID or flu.       REVIEW OF SYSTEMS  Pertinent positives include chest pain, productive cough, tactile fever, chills, bloating, tingling in arm . Pertinent negatives include swelling or pain in one leg. All other systems negative.    PAST MEDICAL HISTORY   has a past medical history of Asthma.    SURGICAL HISTORY   has a past surgical history that includes appendectomy; tubal coagulation laparoscopic bilateral; and ventral hernia repair.    SOCIAL HISTORY  Social History     Tobacco Use    Smoking status: Some Days     Years: 10.00     Types: Cigarettes    Smokeless tobacco: Never     "Tobacco comments:     about 2 cigarettes a week.    Vaping Use    Vaping Use: Never used   Substance Use Topics    Alcohol use: Yes     Comment: 1-2 beers 3 x per week    Drug use: Yes     Comment: marijuana daily      Social History     Substance and Sexual Activity   Drug Use Yes    Comment: marijuana daily       FAMILY HISTORY  Family History   Problem Relation Age of Onset    Stroke Mother     Psychiatric Illness Mother     Asthma Father     Hypertension Father     Hyperlipidemia Father     Heart Disease Father     Asthma Sister     Diabetes Maternal Grandmother     Diabetes Maternal Grandfather     Allergies Son     Asthma Son     Allergies Son     Allergies Daughter        CURRENT MEDICATIONS  Home Medications       Reviewed by Eliza Skaggs R.N. (Registered Nurse) on 11/07/22 at 2028  Med List Status: Not Addressed     Medication Last Dose Status   albuterol 108 (90 Base) MCG/ACT Aero Soln inhalation aerosol  Active                    ALLERGIES  Allergies   Allergen Reactions    Pollen Extract      \"Eyes water,can't breathe\"       PHYSICAL EXAM  VITAL SIGNS: BP (!) 98/47   Pulse 76   Temp 36.7 °C (98.1 °F) (Temporal)   Resp 18   Ht 1.6 m (5' 3\")   Wt 104 kg (228 lb 6.3 oz)   SpO2 97%   BMI 40.46 kg/m²     Constitutional: Well developed, Well nourished, mild distress.   HENT: Normocephalic, Atraumatic, mask in place.  Eyes: Conjunctiva normal, No discharge.   Neck: Supple, No stridor   Cardiovascular: Normal heart rate, Normal rhythm, No murmurs, equal pulses.   Pulmonary: Normal breath sounds, No respiratory distress, No wheezing, No rales, No rhonchi.  Chest: No chest wall tenderness or deformity.   Abdomen:Soft, No tenderness, No masses, no rebound, no guarding.   Back: No CVA tenderness.   Musculoskeletal: No major deformities noted, No tenderness. No edema in legs. No calf tenderness. Calve appear symmetric. No chords.   Skin: Warm, Dry, No erythema, No rash.   Neurologic: Alert & oriented x 3, " Normal motor function,  No focal deficits noted.   Psychiatric: Affect normal, Judgment normal, Mood normal.     LABS  Results for orders placed or performed during the hospital encounter of 11/07/22   CBC WITH DIFFERENTIAL   Result Value Ref Range    WBC 9.1 4.8 - 10.8 K/uL    RBC 4.43 4.20 - 5.40 M/uL    Hemoglobin 14.1 12.0 - 16.0 g/dL    Hematocrit 42.0 37.0 - 47.0 %    MCV 94.8 81.4 - 97.8 fL    MCH 31.8 27.0 - 33.0 pg    MCHC 33.6 33.6 - 35.0 g/dL    RDW 45.5 35.9 - 50.0 fL    Platelet Count 393 164 - 446 K/uL    MPV 9.3 9.0 - 12.9 fL    Neutrophils-Polys 57.30 44.00 - 72.00 %    Lymphocytes 22.40 22.00 - 41.00 %    Monocytes 8.60 0.00 - 13.40 %    Eosinophils 10.20 (H) 0.00 - 6.90 %    Basophils 0.60 0.00 - 1.80 %    Immature Granulocytes 0.90 0.00 - 0.90 %    Nucleated RBC 0.00 /100 WBC    Neutrophils (Absolute) 5.19 2.00 - 7.15 K/uL    Lymphs (Absolute) 2.03 1.00 - 4.80 K/uL    Monos (Absolute) 0.78 0.00 - 0.85 K/uL    Eos (Absolute) 0.92 (H) 0.00 - 0.51 K/uL    Baso (Absolute) 0.05 0.00 - 0.12 K/uL    Immature Granulocytes (abs) 0.08 0.00 - 0.11 K/uL    NRBC (Absolute) 0.00 K/uL   TROPONIN   Result Value Ref Range    Troponin T <6 6 - 19 ng/L   COMP METABOLIC PANEL   Result Value Ref Range    Sodium 139 135 - 145 mmol/L    Potassium 3.6 3.6 - 5.5 mmol/L    Chloride 109 96 - 112 mmol/L    Co2 17 (L) 20 - 33 mmol/L    Anion Gap 13.0 7.0 - 16.0    Glucose 125 (H) 65 - 99 mg/dL    Bun 8 8 - 22 mg/dL    Creatinine 0.63 0.50 - 1.40 mg/dL    Calcium 8.6 8.4 - 10.2 mg/dL    AST(SGOT) 13 12 - 45 U/L    ALT(SGPT) 15 2 - 50 U/L    Alkaline Phosphatase 70 30 - 99 U/L    Total Bilirubin 0.2 0.1 - 1.5 mg/dL    Albumin 3.7 3.2 - 4.9 g/dL    Total Protein 6.2 6.0 - 8.2 g/dL    Globulin 2.5 1.9 - 3.5 g/dL    A-G Ratio 1.5 g/dL   D-DIMER   Result Value Ref Range    D-Dimer Screen 0.40 0.00 - 0.50 ug/mL (FEU)   HCG QUAL SERUM   Result Value Ref Range    Beta-Hcg Qualitative Serum Negative Negative   ESTIMATED GFR   Result  Value Ref Range    GFR (CKD-EPI) 116 >60 mL/min/1.73 m 2   CoV-2, FLU A/B, and RSV by PCR (2-4 Hours CEPHEID) : Collect NP swab in VTM    Specimen: Respirate   Result Value Ref Range    SARS-CoV-2 Source NP Swab    EKG (NOW)   Result Value Ref Range    Report       [facility] Emergency Dept.    Test Date:  2022  Pt Name:    KINJAL KIDD              Department: [facility]  MRN:        [id number]                      [facility]  Gender:     Female                       Technician: RAUDEL  :        1985                   Requested By:CINDI CAZARES  Order #:    [id number]                    Reading MD: CINDI CAZARES MD    Measurements  Intervals                                Axis  Rate:       78                           P:          64  AR:         140                          QRS:        54  QRSD:       93                           T:          54  QT:         388  QTc:        442    Interpretive Statements  Sinus rhythm, rate of 78, normal axis, no ST elevation.  Borderline T abnormalities, anterior leads  Compared to ECG 2022 07:38:41  T-wave abnormality now present  Electronically Signed On 2022 22:25:19 PST by CINDI CAZARES MD         All labs reviewed by me.    EKG  12 Lead EKG interpreted by me as shown above.     RADIOLOGY  DX-CHEST-PORTABLE (1 VIEW)   Final Result      1.  Small linear opacity in left perihilar region likely atelectasis.        The radiologist's interpretation of all radiological studies have been reviewed by me.    COURSE & MEDICAL DECISION MAKING  Pertinent Labs & Imaging studies reviewed. (See chart for details)    8:53 PM - Patient seen and examined at bedside. Patient will be treated with Albuterol 2.5 mg/0.5 mL. Ordered DX-chest, CBC with diff, Troponin, CMP, D-dimer, HCG Qual, and EKG to evaluate her symptoms. The differential diagnoses include but are not limited to: COVID vs Flu vs Pulmonary embolism vs  Asthmatic exacerbation vs Pneumonia.      Reevaluated the patient at 2215.  After breathing treatment she is moving air better and feels like she is breathing better.  I discussed with her that the lab findings that there is no signs of a DVT, no pneumonia and no signs of myocardial infarction.  Discussed with her that I do think she may be having a mild asthma exacerbation causing her shortness of breath.  We will go ahead and start her on a prednisone burst.  I will refill the patient's albuterol.  COVID and flu test is still pending at this point time the patient is not hypoxic [facility]  She is already been symptomatic for over a week and therefore is outside the window for Tamiflu or Paxlovid.    Medical Decision Making: Patient presents emergency department with intermittent chest pain as well as shortness of breath.  Her chest pain seem to be more pleuritic sending in the right side of her chest.  This is been going on for over a week.  EKG is unremarkable and she has a negative troponin given the fact that she had this pain for over 12 hours a has a negative troponin I do not think she is having a myocardial infarct.  Patient's D-dimer is negative.  She is not tachycardic, not hypoxic and no signs of DVT I do not think she needs a CT of her chest.  I do not think she has a pulmonary embolism.     The patient will return for new or worsening symptoms and is stable at the time of discharge.    The patient is referred to a primary physician for blood pressure management, diabetic screening, and for all other preventative health concerns.        DISPOSITION:  Patient will be discharged home in stable condition.    FOLLOW UP:  Stephy Hinkle P.A.-C.  [address] [place] [id number]  [phone]    Schedule an appointment as soon as possible for a visit in 3 days        OUTPATIENT MEDICATIONS:  Discharge Medication List as of 11/7/2022 10:27 PM        START taking these  medications    Details   !! albuterol 108 (90 Base) MCG/ACT Aero Soln inhalation aerosol Inhale 2 Puffs every four hours as needed for Shortness of Breath., Disp-8 g, R-0, Normal      predniSONE (DELTASONE) 20 MG Tab Take 3 Tablets by mouth every day for 5 days., Disp-15 Tablet, R-0, Normal       !! - Potential duplicate medications found. Please discuss with provider.             FINAL IMPRESSION  1. Mild intermittent asthma with acute exacerbation    2. Pleuritic chest pain          Wendi COSME (Bruce), am scribing for, and in the presence of, Jose Wolff M.D.    Electronically signed by: Wendi Whaley (Bruce), 11/7/2022    Jose COSME M.D. personally performed the services described in this documentation, as scribed by Wendi Whaley in my presence, and it is both accurate and complete.    The note accurately reflects work and decisions made by me.  Jose Wolff M.D.  11/7/2022  10:53 PM

## 2022-11-10 ENCOUNTER — HOSPITAL ENCOUNTER (EMERGENCY)
Facility: MEDICAL CENTER | Age: 37
End: 2022-11-10
Attending: EMERGENCY MEDICINE
Payer: COMMERCIAL

## 2022-11-10 ENCOUNTER — APPOINTMENT (OUTPATIENT)
Dept: RADIOLOGY | Facility: MEDICAL CENTER | Age: 37
End: 2022-11-10
Attending: EMERGENCY MEDICINE
Payer: COMMERCIAL

## 2022-11-10 VITALS
RESPIRATION RATE: 20 BRPM | SYSTOLIC BLOOD PRESSURE: 126 MMHG | WEIGHT: 226.19 LBS | TEMPERATURE: 97.8 F | DIASTOLIC BLOOD PRESSURE: 61 MMHG | HEART RATE: 78 BPM | OXYGEN SATURATION: 94 % | BODY MASS INDEX: 40.07 KG/M2

## 2022-11-10 DIAGNOSIS — J45.901 ASTHMA WITH ACUTE EXACERBATION, UNSPECIFIED ASTHMA SEVERITY, UNSPECIFIED WHETHER PERSISTENT: ICD-10-CM

## 2022-11-10 LAB
ALBUMIN SERPL BCP-MCNC: 3.5 G/DL (ref 3.2–4.9)
ALBUMIN/GLOB SERPL: 1.3 G/DL
ALP SERPL-CCNC: 54 U/L (ref 30–99)
ALT SERPL-CCNC: 16 U/L (ref 2–50)
ANION GAP SERPL CALC-SCNC: 10 MMOL/L (ref 7–16)
AST SERPL-CCNC: 20 U/L (ref 12–45)
BILIRUB SERPL-MCNC: 0.2 MG/DL (ref 0.1–1.5)
BUN SERPL-MCNC: 10 MG/DL (ref 8–22)
CALCIUM SERPL-MCNC: 8.6 MG/DL (ref 8.5–10.5)
CHLORIDE SERPL-SCNC: 107 MMOL/L (ref 96–112)
CO2 SERPL-SCNC: 21 MMOL/L (ref 20–33)
CREAT SERPL-MCNC: 0.58 MG/DL (ref 0.5–1.4)
EKG IMPRESSION: NORMAL
GFR SERPLBLD CREATININE-BSD FMLA CKD-EPI: 118 ML/MIN/1.73 M 2
GLOBULIN SER CALC-MCNC: 2.6 G/DL (ref 1.9–3.5)
GLUCOSE SERPL-MCNC: 134 MG/DL (ref 65–99)
POTASSIUM SERPL-SCNC: 4.2 MMOL/L (ref 3.6–5.5)
PROCALCITONIN SERPL-MCNC: 0.05 NG/ML
PROT SERPL-MCNC: 6.1 G/DL (ref 6–8.2)
SODIUM SERPL-SCNC: 138 MMOL/L (ref 135–145)

## 2022-11-10 PROCEDURE — 36415 COLL VENOUS BLD VENIPUNCTURE: CPT

## 2022-11-10 PROCEDURE — 96374 THER/PROPH/DIAG INJ IV PUSH: CPT

## 2022-11-10 PROCEDURE — 71045 X-RAY EXAM CHEST 1 VIEW: CPT

## 2022-11-10 PROCEDURE — 700101 HCHG RX REV CODE 250: Performed by: EMERGENCY MEDICINE

## 2022-11-10 PROCEDURE — 84145 PROCALCITONIN (PCT): CPT

## 2022-11-10 PROCEDURE — 99284 EMERGENCY DEPT VISIT MOD MDM: CPT

## 2022-11-10 PROCEDURE — 94640 AIRWAY INHALATION TREATMENT: CPT

## 2022-11-10 PROCEDURE — 94760 N-INVAS EAR/PLS OXIMETRY 1: CPT

## 2022-11-10 PROCEDURE — 93005 ELECTROCARDIOGRAM TRACING: CPT | Performed by: EMERGENCY MEDICINE

## 2022-11-10 PROCEDURE — 700111 HCHG RX REV CODE 636 W/ 250 OVERRIDE (IP): Performed by: EMERGENCY MEDICINE

## 2022-11-10 PROCEDURE — 93005 ELECTROCARDIOGRAM TRACING: CPT

## 2022-11-10 PROCEDURE — 80053 COMPREHEN METABOLIC PANEL: CPT

## 2022-11-10 RX ORDER — METHYLPREDNISOLONE SODIUM SUCCINATE 125 MG/2ML
125 INJECTION, POWDER, LYOPHILIZED, FOR SOLUTION INTRAMUSCULAR; INTRAVENOUS ONCE
Status: COMPLETED | OUTPATIENT
Start: 2022-11-10 | End: 2022-11-10

## 2022-11-10 RX ORDER — IPRATROPIUM BROMIDE AND ALBUTEROL SULFATE 2.5; .5 MG/3ML; MG/3ML
3 SOLUTION RESPIRATORY (INHALATION) ONCE
Status: COMPLETED | OUTPATIENT
Start: 2022-11-10 | End: 2022-11-10

## 2022-11-10 RX ADMIN — IPRATROPIUM BROMIDE AND ALBUTEROL SULFATE 3 ML: .5; 2.5 SOLUTION RESPIRATORY (INHALATION) at 11:49

## 2022-11-10 RX ADMIN — METHYLPREDNISOLONE SODIUM SUCCINATE 125 MG: 125 INJECTION, POWDER, FOR SOLUTION INTRAMUSCULAR; INTRAVENOUS at 11:47

## 2022-11-10 ASSESSMENT — COPD QUESTIONNAIRES
DURING THE PAST 4 WEEKS HOW MUCH DID YOU FEEL SHORT OF BREATH: SOME OF THE TIME
DO YOU EVER COUGH UP ANY MUCUS OR PHLEGM?: NO/ONLY WITH OCCASIONAL COLDS OR INFECTIONS
COPD SCREENING SCORE: 1
HAVE YOU SMOKED AT LEAST 100 CIGARETTES IN YOUR ENTIRE LIFE: NO/DON'T KNOW

## 2022-11-10 ASSESSMENT — FIBROSIS 4 INDEX: FIB4 SCORE: 0.33

## 2022-11-10 NOTE — ED PROVIDER NOTES
ED Provider Note    ER PROVIDER NOTE      CHIEF COMPLAINT  Chief Complaint   Patient presents with    Shortness of Breath       [facility]  Jenni Jensen is a 37 y.o. female who presents to the emergency department complaining of shortness of breath.  Patient reports she has had some slight cough but continued shortness of breath of the last week and a half.  She does have a history of asthma, was seen a few days ago at [facility], given steroid as well as refill of her inhaler although she reports her symptoms are still present.  She reports no real chest pain it is mainly just a tightness in her chest although she does have some rough pain with the cough itself.  No known fevers.  No lightheadedness or syncope.  No new leg pain or swelling.  No abdominal pain, nausea vomiting or diarrhea.    REVIEW OF SYSTEMS  Pertinent positives include this of breath, cough. Pertinent negatives include no fever. See HPI for details. All other systems reviewed and are negative.    PAST MEDICAL HISTORY   has a past medical history of Asthma.    SURGICAL HISTORY   has a past surgical history that includes appendectomy; tubal coagulation laparoscopic bilateral; and ventral hernia repair.    FAMILY HISTORY  Family History   Problem Relation Age of Onset    Stroke Mother     Psychiatric Illness Mother     Asthma Father     Hypertension Father     Hyperlipidemia Father     Heart Disease Father     Asthma Sister     Diabetes Maternal Grandmother     Diabetes Maternal Grandfather     Allergies Son     Asthma Son     Allergies Son     Allergies Daughter        SOCIAL HISTORY  Social History     Socioeconomic History    Marital status:    Tobacco Use    Smoking status: Some Days     Years: 10.00     Types: Cigarettes    Smokeless tobacco: Never    Tobacco comments:     about 2 cigarettes a week.    Vaping Use    Vaping Use: Never used   Substance and Sexual Activity    Alcohol use: Yes     Comment: 1-2 beers 3 x per week    Drug  "use: Yes     Comment: marijuana daily    Sexual activity: Not Currently     Partners: Male      Social History     Substance and Sexual Activity   Drug Use Yes    Comment: marijuana daily       CURRENT MEDICATIONS  Home Medications       Reviewed by Deb Kim R.N. (Registered Nurse) on 11/10/22 at 1016  Med List Status: <None>     Medication Last Dose Status   albuterol 108 (90 Base) MCG/ACT Aero Soln inhalation aerosol  Active   albuterol 108 (90 Base) MCG/ACT Aero Soln inhalation aerosol  Active   predniSONE (DELTASONE) 20 MG Tab  Active                    ALLERGIES  Allergies   Allergen Reactions    Pollen Extract      \"Eyes water,can't breathe\"       PHYSICAL EXAM  VITAL SIGNS: /61   Pulse 78   Temp 36.6 °C (97.8 °F) (Temporal)   Resp 20   Wt 103 kg (226 lb 3.1 oz)   SpO2 94%   BMI 40.07 kg/m²   Pulse ox interpretation: I interpret this pulse ox as normal.    Constitutional: Alert in no apparent distress.  HENT: No signs of trauma, Bilateral external ears normal, Nose normal.   Eyes: Pupils are equal and reactive, Conjunctiva normal, Non-icteric.   Neck: Normal range of motion, No tenderness, Supple, No stridor.   Lymphatic: No lymphadenopathy noted.   Cardiovascular: Regular rate and rhythm, no murmurs.   Thorax & Lungs: Normal breath sounds, No respiratory distress, scattered wheezes, No chest tenderness.   Abdomen: Bowel sounds normal, Soft, No tenderness, No masses, No pulsatile masses. No peritoneal signs.  Skin: Warm, Dry, No erythema, No rash.   Back: No bony tenderness, No CVA tenderness.   Extremities: Intact distal pulses, No edema, No tenderness, No cyanosis, Negative Mona's sign.  Musculoskeletal: Good range of motion in all major joints. No tenderness to palpation or major deformities noted.   Neurologic: Alert , Normal motor function, Normal sensory function, No focal deficits noted.   Psychiatric: Affect normal, Judgment normal, Mood normal.     DIAGNOSTIC STUDIES / " PROCEDURES    Results for orders placed or performed during the hospital encounter of 11/10/22   COMP METABOLIC PANEL   Result Value Ref Range    Sodium 138 135 - 145 mmol/L    Potassium 4.2 3.6 - 5.5 mmol/L    Chloride 107 96 - 112 mmol/L    Co2 21 20 - 33 mmol/L    Anion Gap 10.0 7.0 - 16.0    Glucose 134 (H) 65 - 99 mg/dL    Bun 10 8 - 22 mg/dL    Creatinine 0.58 0.50 - 1.40 mg/dL    Calcium 8.6 8.5 - 10.5 mg/dL    AST(SGOT) 20 12 - 45 U/L    ALT(SGPT) 16 2 - 50 U/L    Alkaline Phosphatase 54 30 - 99 U/L    Total Bilirubin 0.2 0.1 - 1.5 mg/dL    Albumin 3.5 3.2 - 4.9 g/dL    Total Protein 6.1 6.0 - 8.2 g/dL    Globulin 2.6 1.9 - 3.5 g/dL    A-G Ratio 1.3 g/dL   PROCALCITONIN   Result Value Ref Range    Procalcitonin 0.05 <0.25 ng/mL   ESTIMATED GFR   Result Value Ref Range    GFR (CKD-EPI) 118 >60 mL/min/1.73 m 2   EKG   Result Value Ref Range    Report       [facility] Emergency Dept.    Test Date:  2022-11-10  Pt Name:    KINJAL KIDD              Department: ER  MRN:        [id number]                      Room:  Gender:     Female                       Technician: [id number]  :        1985                   Requested By:ER TRIAGE PROTOCOL  Order #:    [id number]                    Reading MD: CROW MILLER MD    Measurements  Intervals                                Axis  Rate:       74                           P:          26  [place]:         117                          QRS:        37  QRSD:       93                           T:          50  QT:         376  QTc:        418    Interpretive Statements  Sinus rhythm  Borderline short [place] interval  Borderline T abnormalities, anterior leads  Compared to ECG 2022 20:30:51  ST (T wave) deviation no longer present  T-wave abnormality still present  Electronically Signed On 11- 11:03:37 PST by CROW MILLER MD           RADIOLOGY  DX-CHEST-PORTABLE (1 VIEW)   Final Result      No acute cardiopulmonary abnormality.           The  radiologist's interpretation of all radiological studies have been reviewed and images independently viewed by me.    COURSE & MEDICAL DECISION MAKING  Nursing notes, VS, PMSFHx reviewed in chart.    10:53 AM Patient seen and examined at bedside. Patient will be treated with Solu-Medrol, DuoNeb. Ordered for chest x-ray, CBC, CMP, procalcitonin to evaluate her symptoms.     2:06 PM  Patient is reevaluated, she is resting comfortably without any increased work of breathing.  Updated on all results and she would like to be discharged at this time    I reviewed the patient records, she was seen 2 days ago at [facility], had unremarkable cardiac work-up including ECG and troponin, negative D-dimer was started on steroid      Decision Making:  This is a 37 y.o. female presented with some shortness of breath.  This does appear to be most likely due to her underlying asthma given the nature of her symptoms as well as the wheezing noted on exam.  In discussion with the patient she was started on a steroid but really only took the full dose yesterday and she is still feeling short of breath.  At this point no findings to suggest emergent or dangerous pathology and advised to continue her inhaler and the steroid.  She has no focal pulmonary findings on exam or x-ray to suggest pneumonia and her procalcitonin is negative.  No chest pain or findings of ischemia on her ECG I do not think this is cardiac related.  D-dimer from recently was negative and her symptoms do not strongly suggest pulmonary embolism    The patient will return for new or worsening symptoms and is stable at the time of discharge.    The patient is referred to a primary physician for blood pressure management, diabetic screening, and for all other preventative health concerns.        DISPOSITION:  Patient will be discharged home in stable condition.    FOLLOW UP:  Stephy Hinkle P.A.-C.  [address] [facility] [id number]  [phone]    In 1  week      OUTPATIENT MEDICATIONS:  New Prescriptions    No medications on file         FINAL IMPRESSION  1. Asthma with acute exacerbation, unspecified asthma severity, unspecified whether persistent         The note accurately reflects work and decisions made by me.  Zachery Kinsey M.D.  11/10/2022  2:20 PM

## 2022-11-10 NOTE — ED NOTES
Pt ambulated to restroom with steady gait.   Lab called to check on status of lab results, estimating around 10 more minutes.

## 2022-11-10 NOTE — ED NOTES
Discharge instructions given to pt. Prescriptions unchanged. Pt educated, verbalizes understanding. All belongings accounted for. Pt ambulated out of ED with steady gait to go home with family at side. PIV removed and dressing applied.

## 2022-11-10 NOTE — ED TRIAGE NOTES
Patient has asthma, feels sob, no impairment of speech, seen a couple of days ago and given inhaler and steroids, she states no significant improvement, patient does smoke, no noted fevers, vitals stable.
